# Patient Record
Sex: FEMALE | Race: WHITE | Employment: OTHER | URBAN - METROPOLITAN AREA
[De-identification: names, ages, dates, MRNs, and addresses within clinical notes are randomized per-mention and may not be internally consistent; named-entity substitution may affect disease eponyms.]

---

## 2017-05-19 ENCOUNTER — OFFICE VISIT (OUTPATIENT)
Dept: ORTHOPEDIC SURGERY | Age: 77
End: 2017-05-19

## 2017-05-19 VITALS — HEIGHT: 66 IN | BODY MASS INDEX: 30.58 KG/M2 | WEIGHT: 190.26 LBS

## 2017-05-19 DIAGNOSIS — M19.019 SHOULDER ARTHRITIS: ICD-10-CM

## 2017-05-19 DIAGNOSIS — M25.511 PAIN OF RIGHT SHOULDER REGION: Primary | ICD-10-CM

## 2017-05-19 PROCEDURE — 1123F ACP DISCUSS/DSCN MKR DOCD: CPT | Performed by: ORTHOPAEDIC SURGERY

## 2017-05-19 PROCEDURE — 3017F COLORECTAL CA SCREEN DOC REV: CPT | Performed by: ORTHOPAEDIC SURGERY

## 2017-05-19 PROCEDURE — 3014F SCREEN MAMMO DOC REV: CPT | Performed by: ORTHOPAEDIC SURGERY

## 2017-05-19 PROCEDURE — G8427 DOCREV CUR MEDS BY ELIG CLIN: HCPCS | Performed by: ORTHOPAEDIC SURGERY

## 2017-05-19 PROCEDURE — 1090F PRES/ABSN URINE INCON ASSESS: CPT | Performed by: ORTHOPAEDIC SURGERY

## 2017-05-19 PROCEDURE — 4040F PNEUMOC VAC/ADMIN/RCVD: CPT | Performed by: ORTHOPAEDIC SURGERY

## 2017-05-19 PROCEDURE — 1036F TOBACCO NON-USER: CPT | Performed by: ORTHOPAEDIC SURGERY

## 2017-05-19 PROCEDURE — 73030 X-RAY EXAM OF SHOULDER: CPT | Performed by: ORTHOPAEDIC SURGERY

## 2017-05-19 PROCEDURE — G8400 PT W/DXA NO RESULTS DOC: HCPCS | Performed by: ORTHOPAEDIC SURGERY

## 2017-05-19 PROCEDURE — 99213 OFFICE O/P EST LOW 20 MIN: CPT | Performed by: ORTHOPAEDIC SURGERY

## 2017-05-19 PROCEDURE — G8417 CALC BMI ABV UP PARAM F/U: HCPCS | Performed by: ORTHOPAEDIC SURGERY

## 2017-05-19 RX ORDER — MELOXICAM 7.5 MG/1
7.5 TABLET ORAL
Status: ON HOLD | COMMUNITY
Start: 2017-05-02 | End: 2019-07-11

## 2017-05-30 DIAGNOSIS — M19.019 SHOULDER ARTHRITIS: Primary | ICD-10-CM

## 2017-06-08 DIAGNOSIS — M25.511 RIGHT SHOULDER PAIN, UNSPECIFIED CHRONICITY: Primary | ICD-10-CM

## 2017-06-08 PROCEDURE — L3670 SO ACRO/CLAV CAN WEB PRE OTS: HCPCS | Performed by: ORTHOPAEDIC SURGERY

## 2017-06-20 LAB
ABO GROUPING: NORMAL
ANION GAP SERPL CALCULATED.3IONS-SCNC: 9 MMOL/L (ref 5–13)
ANTIBODY SCREEN: NEGATIVE
APTT: 31.9 SECONDS (ref 23.1–37.6)
BACTERIA: ABNORMAL /HPF
BILIRUBIN URINE: NEGATIVE
BUN / CREAT RATIO: 19
BUN BLDV-MCNC: 18 MG/DL (ref 7–25)
CALCIUM SERPL-MCNC: 10.3 MG/DL (ref 8.4–10.5)
CHLORIDE BLD-SCNC: 105 MMOL/L (ref 98–110)
CLARITY: CLEAR
CO2: 24 MMOL/L (ref 22–29)
COLOR: YELLOW
CREAT SERPL-MCNC: 0.96 MG/DL (ref 0.5–1.2)
EPITHELIAL CELLS, UA: 8 /HPF (ref 0–5)
ERYTHROCYTES URINE: NEGATIVE
GFR AFRICAN AMERICAN: 69 SEE NOTE
GFR NON-AFRICAN AMERICAN: 57 SEE NOTE
GLUCOSE BLD-MCNC: 93 MG/DL (ref 70–99)
GLUCOSE URINE: NEGATIVE MG/DL
HCT VFR BLD CALC: 42.8 % (ref 35–45)
HEMOGLOBIN: 14.5 G/DL (ref 11.7–15.5)
HYALINE CASTS: 1 /LPF (ref 0–2)
INR BLD: 1.1 (ref 0.9–1.1)
KETONES, URINE: NEGATIVE MG/DL
LEUKOCYTE ESTERASE, URINE: ABNORMAL
LEUKOCYTES, UA: 11 /HPF (ref 0–5)
MCH RBC QN AUTO: 27.5 PG (ref 27–33)
MCHC RBC AUTO-ENTMCNC: 33.8 G/DL (ref 32–36)
MCV RBC AUTO: 81.1 FL (ref 80–100)
NITRITE, URINE: NEGATIVE
PDW BLD-RTO: 13.8 % (ref 11–15)
PH UA: 5 (ref 5–8)
PLATELET # BLD: 220 10*3/UL (ref 140–400)
PMV BLD AUTO: 10.3 FL (ref 7.5–11.5)
POTASSIUM SERPL-SCNC: 4.2 MMOL/L (ref 3.5–5.1)
PROTEIN UA: NEGATIVE MG/DL
PROTHROMBIN TIME: 11.8 SECONDS (ref 9.4–12.6)
RBC # BLD: 5.28 10*6/UL (ref 3.8–5.1)
RBC UA: 1 /HPF (ref 0–3)
RH FACTOR: POSITIVE
SODIUM BLD-SCNC: 138 MMOL/L (ref 135–146)
SPECIFIC GRAVITY UA: 1.02 (ref 1–1.03)
STAPH AUREUS SCREEN: NEGATIVE
STAPH AUREUS.METHICILLIN RESISTANT DNA: NEGATIVE
UROBILINOGEN, URINE: <2 MG/DL
WBC # BLD: 7.5 10*3/UL (ref 3.8–10.8)

## 2017-06-21 LAB — BACTERIA IDENTIFIED: NORMAL

## 2017-06-30 DIAGNOSIS — M19.019 SHOULDER ARTHRITIS: Primary | ICD-10-CM

## 2017-07-05 ENCOUNTER — TELEPHONE (OUTPATIENT)
Dept: ORTHOPEDIC SURGERY | Age: 77
End: 2017-07-05

## 2017-07-07 ENCOUNTER — OFFICE VISIT (OUTPATIENT)
Dept: ORTHOPEDIC SURGERY | Age: 77
End: 2017-07-07

## 2017-07-07 VITALS — HEIGHT: 66 IN | BODY MASS INDEX: 30.58 KG/M2 | WEIGHT: 190.26 LBS

## 2017-07-07 DIAGNOSIS — M25.511 PAIN, JOINT, SHOULDER REGION, RIGHT: Primary | ICD-10-CM

## 2017-07-07 DIAGNOSIS — Z96.611 STATUS POST TOTAL REPLACEMENT OF RIGHT SHOULDER: ICD-10-CM

## 2017-07-07 PROCEDURE — 99024 POSTOP FOLLOW-UP VISIT: CPT | Performed by: PHYSICIAN ASSISTANT

## 2017-07-07 PROCEDURE — 73030 X-RAY EXAM OF SHOULDER: CPT | Performed by: PHYSICIAN ASSISTANT

## 2017-08-18 ENCOUNTER — OFFICE VISIT (OUTPATIENT)
Dept: ORTHOPEDIC SURGERY | Age: 77
End: 2017-08-18

## 2017-08-18 VITALS — WEIGHT: 190.26 LBS | BODY MASS INDEX: 30.58 KG/M2 | HEIGHT: 66 IN

## 2017-08-18 DIAGNOSIS — M25.511 RIGHT SHOULDER PAIN, UNSPECIFIED CHRONICITY: Primary | ICD-10-CM

## 2017-08-18 PROCEDURE — 99024 POSTOP FOLLOW-UP VISIT: CPT | Performed by: ORTHOPAEDIC SURGERY

## 2017-08-18 PROCEDURE — 73030 X-RAY EXAM OF SHOULDER: CPT | Performed by: ORTHOPAEDIC SURGERY

## 2017-10-10 ENCOUNTER — OFFICE VISIT (OUTPATIENT)
Dept: ORTHOPEDIC SURGERY | Age: 77
End: 2017-10-10

## 2017-10-10 VITALS — BODY MASS INDEX: 30.22 KG/M2 | HEIGHT: 66 IN | WEIGHT: 188 LBS

## 2017-10-10 DIAGNOSIS — M25.511 RIGHT SHOULDER PAIN, UNSPECIFIED CHRONICITY: Primary | ICD-10-CM

## 2017-10-10 PROCEDURE — 1036F TOBACCO NON-USER: CPT | Performed by: ORTHOPAEDIC SURGERY

## 2017-10-10 PROCEDURE — 99213 OFFICE O/P EST LOW 20 MIN: CPT | Performed by: ORTHOPAEDIC SURGERY

## 2017-10-10 PROCEDURE — 1090F PRES/ABSN URINE INCON ASSESS: CPT | Performed by: ORTHOPAEDIC SURGERY

## 2017-10-10 PROCEDURE — G8400 PT W/DXA NO RESULTS DOC: HCPCS | Performed by: ORTHOPAEDIC SURGERY

## 2017-10-10 PROCEDURE — 3014F SCREEN MAMMO DOC REV: CPT | Performed by: ORTHOPAEDIC SURGERY

## 2017-10-10 PROCEDURE — 1123F ACP DISCUSS/DSCN MKR DOCD: CPT | Performed by: ORTHOPAEDIC SURGERY

## 2017-10-10 PROCEDURE — G8484 FLU IMMUNIZE NO ADMIN: HCPCS | Performed by: ORTHOPAEDIC SURGERY

## 2017-10-10 PROCEDURE — 3017F COLORECTAL CA SCREEN DOC REV: CPT | Performed by: ORTHOPAEDIC SURGERY

## 2017-10-10 PROCEDURE — G8427 DOCREV CUR MEDS BY ELIG CLIN: HCPCS | Performed by: ORTHOPAEDIC SURGERY

## 2017-10-10 PROCEDURE — 73030 X-RAY EXAM OF SHOULDER: CPT | Performed by: ORTHOPAEDIC SURGERY

## 2017-10-10 PROCEDURE — 4040F PNEUMOC VAC/ADMIN/RCVD: CPT | Performed by: ORTHOPAEDIC SURGERY

## 2017-10-10 PROCEDURE — G8417 CALC BMI ABV UP PARAM F/U: HCPCS | Performed by: ORTHOPAEDIC SURGERY

## 2018-06-05 ENCOUNTER — OFFICE VISIT (OUTPATIENT)
Dept: ORTHOPEDIC SURGERY | Age: 78
End: 2018-06-05

## 2018-06-05 DIAGNOSIS — M19.019 SHOULDER ARTHRITIS: ICD-10-CM

## 2018-06-05 DIAGNOSIS — Z96.611 STATUS POST TOTAL REPLACEMENT OF RIGHT SHOULDER: ICD-10-CM

## 2018-06-05 DIAGNOSIS — M25.512 LEFT SHOULDER PAIN, UNSPECIFIED CHRONICITY: Primary | ICD-10-CM

## 2018-06-05 PROCEDURE — 4040F PNEUMOC VAC/ADMIN/RCVD: CPT | Performed by: PHYSICIAN ASSISTANT

## 2018-06-05 PROCEDURE — 3017F COLORECTAL CA SCREEN DOC REV: CPT | Performed by: PHYSICIAN ASSISTANT

## 2018-06-05 PROCEDURE — 1036F TOBACCO NON-USER: CPT | Performed by: PHYSICIAN ASSISTANT

## 2018-06-05 PROCEDURE — 1090F PRES/ABSN URINE INCON ASSESS: CPT | Performed by: PHYSICIAN ASSISTANT

## 2018-06-05 PROCEDURE — G8427 DOCREV CUR MEDS BY ELIG CLIN: HCPCS | Performed by: PHYSICIAN ASSISTANT

## 2018-06-05 PROCEDURE — G8400 PT W/DXA NO RESULTS DOC: HCPCS | Performed by: PHYSICIAN ASSISTANT

## 2018-06-05 PROCEDURE — G8417 CALC BMI ABV UP PARAM F/U: HCPCS | Performed by: PHYSICIAN ASSISTANT

## 2018-06-05 PROCEDURE — 1123F ACP DISCUSS/DSCN MKR DOCD: CPT | Performed by: PHYSICIAN ASSISTANT

## 2018-06-05 PROCEDURE — 99213 OFFICE O/P EST LOW 20 MIN: CPT | Performed by: PHYSICIAN ASSISTANT

## 2019-07-10 ENCOUNTER — APPOINTMENT (OUTPATIENT)
Dept: CT IMAGING | Age: 79
DRG: 871 | End: 2019-07-10
Payer: MEDICARE

## 2019-07-10 ENCOUNTER — HOSPITAL ENCOUNTER (INPATIENT)
Age: 79
LOS: 5 days | Discharge: NURSING, REHAB OR LONG-TERM CARE | DRG: 871 | End: 2019-07-16
Attending: EMERGENCY MEDICINE | Admitting: INTERNAL MEDICINE
Payer: MEDICARE

## 2019-07-10 ENCOUNTER — APPOINTMENT (OUTPATIENT)
Dept: GENERAL RADIOLOGY | Age: 79
DRG: 871 | End: 2019-07-10
Payer: MEDICARE

## 2019-07-10 DIAGNOSIS — N39.0 URINARY TRACT INFECTION WITHOUT HEMATURIA, SITE UNSPECIFIED: Primary | ICD-10-CM

## 2019-07-10 LAB
A/G RATIO: 1.4 (ref 1.1–2.2)
ALBUMIN SERPL-MCNC: 4 G/DL (ref 3.4–5)
ALP BLD-CCNC: 109 U/L (ref 40–129)
ALT SERPL-CCNC: <5 U/L (ref 10–40)
AMORPHOUS: ABNORMAL /HPF
ANION GAP SERPL CALCULATED.3IONS-SCNC: 17 MMOL/L (ref 3–16)
AST SERPL-CCNC: 10 U/L (ref 15–37)
BACTERIA: ABNORMAL /HPF
BASE EXCESS VENOUS: 1 (ref -3–3)
BASOPHILS ABSOLUTE: 0 K/UL (ref 0–0.2)
BASOPHILS RELATIVE PERCENT: 0.3 %
BILIRUB SERPL-MCNC: 0.7 MG/DL (ref 0–1)
BILIRUBIN URINE: NEGATIVE
BLOOD, URINE: NEGATIVE
BUN BLDV-MCNC: 15 MG/DL (ref 7–20)
CALCIUM SERPL-MCNC: 10.4 MG/DL (ref 8.3–10.6)
CHLORIDE BLD-SCNC: 102 MMOL/L (ref 99–110)
CLARITY: ABNORMAL
CO2: 21 MMOL/L (ref 21–32)
COLOR: YELLOW
CREAT SERPL-MCNC: 1.1 MG/DL (ref 0.6–1.2)
EOSINOPHILS ABSOLUTE: 0 K/UL (ref 0–0.6)
EOSINOPHILS RELATIVE PERCENT: 0.1 %
EPITHELIAL CELLS, UA: ABNORMAL /HPF
GFR AFRICAN AMERICAN: 58
GFR NON-AFRICAN AMERICAN: 48
GLOBULIN: 2.9 G/DL
GLUCOSE BLD-MCNC: 126 MG/DL (ref 70–99)
GLUCOSE URINE: NEGATIVE MG/DL
HCO3 VENOUS: 22.8 MMOL/L (ref 23–29)
HCT VFR BLD CALC: 39 % (ref 36–48)
HEMOGLOBIN: 13.3 G/DL (ref 12–16)
KETONES, URINE: ABNORMAL MG/DL
LACTATE: 1.56 MMOL/L (ref 0.4–2)
LEUKOCYTE ESTERASE, URINE: ABNORMAL
LYMPHOCYTES ABSOLUTE: 1.6 K/UL (ref 1–5.1)
LYMPHOCYTES RELATIVE PERCENT: 12.1 %
MAGNESIUM: 1.8 MG/DL (ref 1.8–2.4)
MCH RBC QN AUTO: 29.7 PG (ref 26–34)
MCHC RBC AUTO-ENTMCNC: 34 G/DL (ref 31–36)
MCV RBC AUTO: 87.3 FL (ref 80–100)
MICROSCOPIC EXAMINATION: YES
MONOCYTES ABSOLUTE: 0.9 K/UL (ref 0–1.3)
MONOCYTES RELATIVE PERCENT: 7.1 %
NEUTROPHILS ABSOLUTE: 10.6 K/UL (ref 1.7–7.7)
NEUTROPHILS RELATIVE PERCENT: 80.4 %
NITRITE, URINE: NEGATIVE
O2 SAT, VEN: 94 %
PCO2, VEN: 24.7 MM HG (ref 40–50)
PDW BLD-RTO: 14.7 % (ref 12.4–15.4)
PERFORMED ON: ABNORMAL
PH UA: 8.5 (ref 5–8)
PH VENOUS: 7.57 (ref 7.35–7.45)
PLATELET # BLD: 263 K/UL (ref 135–450)
PMV BLD AUTO: 9.6 FL (ref 5–10.5)
PO2, VEN: 58 MM HG
POC SAMPLE TYPE: ABNORMAL
POTASSIUM REFLEX MAGNESIUM: 3.3 MMOL/L (ref 3.5–5.1)
PROTEIN UA: 30 MG/DL
RBC # BLD: 4.47 M/UL (ref 4–5.2)
RBC UA: ABNORMAL /HPF (ref 0–2)
SODIUM BLD-SCNC: 140 MMOL/L (ref 136–145)
SPECIFIC GRAVITY UA: 1.01 (ref 1–1.03)
TCO2 CALC VENOUS: 24 MMOL/L
TOTAL PROTEIN: 6.9 G/DL (ref 6.4–8.2)
TROPONIN: <0.01 NG/ML
URINE REFLEX TO CULTURE: YES
URINE TYPE: ABNORMAL
UROBILINOGEN, URINE: 0.2 E.U./DL
WBC # BLD: 13.2 K/UL (ref 4–11)
WBC UA: ABNORMAL /HPF (ref 0–5)

## 2019-07-10 PROCEDURE — 84443 ASSAY THYROID STIM HORMONE: CPT

## 2019-07-10 PROCEDURE — 93005 ELECTROCARDIOGRAM TRACING: CPT | Performed by: EMERGENCY MEDICINE

## 2019-07-10 PROCEDURE — 87077 CULTURE AEROBIC IDENTIFY: CPT

## 2019-07-10 PROCEDURE — 85025 COMPLETE CBC W/AUTO DIFF WBC: CPT

## 2019-07-10 PROCEDURE — 87186 SC STD MICRODIL/AGAR DIL: CPT

## 2019-07-10 PROCEDURE — 87086 URINE CULTURE/COLONY COUNT: CPT

## 2019-07-10 PROCEDURE — 36415 COLL VENOUS BLD VENIPUNCTURE: CPT

## 2019-07-10 PROCEDURE — 6370000000 HC RX 637 (ALT 250 FOR IP): Performed by: EMERGENCY MEDICINE

## 2019-07-10 PROCEDURE — 81001 URINALYSIS AUTO W/SCOPE: CPT

## 2019-07-10 PROCEDURE — 87040 BLOOD CULTURE FOR BACTERIA: CPT

## 2019-07-10 PROCEDURE — 96361 HYDRATE IV INFUSION ADD-ON: CPT

## 2019-07-10 PROCEDURE — 99285 EMERGENCY DEPT VISIT HI MDM: CPT

## 2019-07-10 PROCEDURE — 82803 BLOOD GASES ANY COMBINATION: CPT

## 2019-07-10 PROCEDURE — 84436 ASSAY OF TOTAL THYROXINE: CPT

## 2019-07-10 PROCEDURE — 71046 X-RAY EXAM CHEST 2 VIEWS: CPT

## 2019-07-10 PROCEDURE — 70450 CT HEAD/BRAIN W/O DYE: CPT

## 2019-07-10 PROCEDURE — 84484 ASSAY OF TROPONIN QUANT: CPT

## 2019-07-10 PROCEDURE — 80053 COMPREHEN METABOLIC PANEL: CPT

## 2019-07-10 PROCEDURE — 83605 ASSAY OF LACTIC ACID: CPT

## 2019-07-10 PROCEDURE — 83735 ASSAY OF MAGNESIUM: CPT

## 2019-07-10 PROCEDURE — 2580000003 HC RX 258: Performed by: EMERGENCY MEDICINE

## 2019-07-10 RX ORDER — NORTRIPTYLINE HYDROCHLORIDE 25 MG/1
75 CAPSULE ORAL ONCE
Status: COMPLETED | OUTPATIENT
Start: 2019-07-10 | End: 2019-07-11

## 2019-07-10 RX ORDER — QUETIAPINE FUMARATE 300 MG/1
300 TABLET, FILM COATED ORAL ONCE
Status: COMPLETED | OUTPATIENT
Start: 2019-07-10 | End: 2019-07-11

## 2019-07-10 RX ORDER — SODIUM CHLORIDE, SODIUM LACTATE, POTASSIUM CHLORIDE, CALCIUM CHLORIDE 600; 310; 30; 20 MG/100ML; MG/100ML; MG/100ML; MG/100ML
1000 INJECTION, SOLUTION INTRAVENOUS ONCE
Status: COMPLETED | OUTPATIENT
Start: 2019-07-10 | End: 2019-07-10

## 2019-07-10 RX ORDER — ESCITALOPRAM OXALATE 20 MG/1
20 TABLET ORAL ONCE
Status: COMPLETED | OUTPATIENT
Start: 2019-07-10 | End: 2019-07-11

## 2019-07-10 RX ADMIN — CARBIDOPA AND LEVODOPA 3 TABLET: 25; 100 TABLET ORAL at 22:45

## 2019-07-10 RX ADMIN — SODIUM CHLORIDE, POTASSIUM CHLORIDE, SODIUM LACTATE AND CALCIUM CHLORIDE 1000 ML: 600; 310; 30; 20 INJECTION, SOLUTION INTRAVENOUS at 22:07

## 2019-07-11 ENCOUNTER — APPOINTMENT (OUTPATIENT)
Dept: GENERAL RADIOLOGY | Age: 79
DRG: 871 | End: 2019-07-11
Payer: MEDICARE

## 2019-07-11 PROBLEM — N39.0 URINARY TRACT INFECTION: Status: ACTIVE | Noted: 2019-07-11

## 2019-07-11 LAB
ANION GAP SERPL CALCULATED.3IONS-SCNC: 12 MMOL/L (ref 3–16)
BASOPHILS ABSOLUTE: 0 K/UL (ref 0–0.2)
BASOPHILS RELATIVE PERCENT: 0.3 %
BUN BLDV-MCNC: 16 MG/DL (ref 7–20)
CALCIUM SERPL-MCNC: 9.8 MG/DL (ref 8.3–10.6)
CHLORIDE BLD-SCNC: 104 MMOL/L (ref 99–110)
CO2: 24 MMOL/L (ref 21–32)
CREAT SERPL-MCNC: 0.9 MG/DL (ref 0.6–1.2)
EKG ATRIAL RATE: 83 BPM
EKG DIAGNOSIS: NORMAL
EKG P AXIS: 49 DEGREES
EKG P-R INTERVAL: 196 MS
EKG Q-T INTERVAL: 442 MS
EKG QRS DURATION: 98 MS
EKG QTC CALCULATION (BAZETT): 519 MS
EKG R AXIS: 33 DEGREES
EKG T AXIS: 41 DEGREES
EKG VENTRICULAR RATE: 83 BPM
EOSINOPHILS ABSOLUTE: 0 K/UL (ref 0–0.6)
EOSINOPHILS RELATIVE PERCENT: 0.3 %
GFR AFRICAN AMERICAN: >60
GFR NON-AFRICAN AMERICAN: >60
GLUCOSE BLD-MCNC: 111 MG/DL (ref 70–99)
GLUCOSE BLD-MCNC: 113 MG/DL (ref 70–99)
HCT VFR BLD CALC: 34.6 % (ref 36–48)
HEMOGLOBIN: 11.8 G/DL (ref 12–16)
LACTIC ACID: 1 MMOL/L (ref 0.4–2)
LV EF: 58 %
LVEF MODALITY: NORMAL
LYMPHOCYTES ABSOLUTE: 1.4 K/UL (ref 1–5.1)
LYMPHOCYTES RELATIVE PERCENT: 15.7 %
MAGNESIUM: 1.8 MG/DL (ref 1.8–2.4)
MCH RBC QN AUTO: 29.4 PG (ref 26–34)
MCHC RBC AUTO-ENTMCNC: 34 G/DL (ref 31–36)
MCV RBC AUTO: 86.3 FL (ref 80–100)
MONOCYTES ABSOLUTE: 0.7 K/UL (ref 0–1.3)
MONOCYTES RELATIVE PERCENT: 7.6 %
NEUTROPHILS ABSOLUTE: 7 K/UL (ref 1.7–7.7)
NEUTROPHILS RELATIVE PERCENT: 76.1 %
PDW BLD-RTO: 14.3 % (ref 12.4–15.4)
PERFORMED ON: ABNORMAL
PLATELET # BLD: 211 K/UL (ref 135–450)
PMV BLD AUTO: 9 FL (ref 5–10.5)
POTASSIUM REFLEX MAGNESIUM: 3.5 MMOL/L (ref 3.5–5.1)
PROCALCITONIN: 0.08 NG/ML (ref 0–0.15)
RBC # BLD: 4.01 M/UL (ref 4–5.2)
SODIUM BLD-SCNC: 140 MMOL/L (ref 136–145)
T4 TOTAL: 6.5 UG/DL (ref 4.5–10.9)
TSH SERPL DL<=0.05 MIU/L-ACNC: 1.84 UIU/ML (ref 0.27–4.2)
WBC # BLD: 9.2 K/UL (ref 4–11)

## 2019-07-11 PROCEDURE — 80048 BASIC METABOLIC PNL TOTAL CA: CPT

## 2019-07-11 PROCEDURE — 2580000003 HC RX 258: Performed by: STUDENT IN AN ORGANIZED HEALTH CARE EDUCATION/TRAINING PROGRAM

## 2019-07-11 PROCEDURE — 6370000000 HC RX 637 (ALT 250 FOR IP): Performed by: EMERGENCY MEDICINE

## 2019-07-11 PROCEDURE — 6360000002 HC RX W HCPCS: Performed by: STUDENT IN AN ORGANIZED HEALTH CARE EDUCATION/TRAINING PROGRAM

## 2019-07-11 PROCEDURE — C8929 TTE W OR WO FOL WCON,DOPPLER: HCPCS

## 2019-07-11 PROCEDURE — 1200000000 HC SEMI PRIVATE

## 2019-07-11 PROCEDURE — 83735 ASSAY OF MAGNESIUM: CPT

## 2019-07-11 PROCEDURE — 6370000000 HC RX 637 (ALT 250 FOR IP): Performed by: STUDENT IN AN ORGANIZED HEALTH CARE EDUCATION/TRAINING PROGRAM

## 2019-07-11 PROCEDURE — 36415 COLL VENOUS BLD VENIPUNCTURE: CPT

## 2019-07-11 PROCEDURE — 6360000002 HC RX W HCPCS: Performed by: EMERGENCY MEDICINE

## 2019-07-11 PROCEDURE — 87040 BLOOD CULTURE FOR BACTERIA: CPT

## 2019-07-11 PROCEDURE — 83605 ASSAY OF LACTIC ACID: CPT

## 2019-07-11 PROCEDURE — 73630 X-RAY EXAM OF FOOT: CPT

## 2019-07-11 PROCEDURE — 96374 THER/PROPH/DIAG INJ IV PUSH: CPT

## 2019-07-11 PROCEDURE — 84145 PROCALCITONIN (PCT): CPT

## 2019-07-11 PROCEDURE — 2580000003 HC RX 258: Performed by: EMERGENCY MEDICINE

## 2019-07-11 PROCEDURE — 85025 COMPLETE CBC W/AUTO DIFF WBC: CPT

## 2019-07-11 RX ORDER — ESCITALOPRAM OXALATE 10 MG/1
10 TABLET ORAL NIGHTLY
Status: DISCONTINUED | OUTPATIENT
Start: 2019-07-11 | End: 2019-07-16 | Stop reason: HOSPADM

## 2019-07-11 RX ORDER — QUETIAPINE FUMARATE 25 MG/1
50 TABLET, FILM COATED ORAL NIGHTLY
Status: DISCONTINUED | OUTPATIENT
Start: 2019-07-11 | End: 2019-07-12

## 2019-07-11 RX ORDER — SODIUM CHLORIDE, SODIUM LACTATE, POTASSIUM CHLORIDE, CALCIUM CHLORIDE 600; 310; 30; 20 MG/100ML; MG/100ML; MG/100ML; MG/100ML
INJECTION, SOLUTION INTRAVENOUS CONTINUOUS
Status: DISCONTINUED | OUTPATIENT
Start: 2019-07-11 | End: 2019-07-12

## 2019-07-11 RX ORDER — RISPERIDONE 1 MG/1
1 TABLET, FILM COATED ORAL ONCE
Status: COMPLETED | OUTPATIENT
Start: 2019-07-11 | End: 2019-07-11

## 2019-07-11 RX ORDER — PANTOPRAZOLE SODIUM 40 MG/1
40 TABLET, DELAYED RELEASE ORAL
Status: DISCONTINUED | OUTPATIENT
Start: 2019-07-11 | End: 2019-07-16 | Stop reason: HOSPADM

## 2019-07-11 RX ORDER — POTASSIUM CHLORIDE 20 MEQ/1
40 TABLET, EXTENDED RELEASE ORAL ONCE
Status: COMPLETED | OUTPATIENT
Start: 2019-07-11 | End: 2019-07-11

## 2019-07-11 RX ORDER — LORAZEPAM 2 MG/ML
1.5 INJECTION INTRAMUSCULAR ONCE
Status: COMPLETED | OUTPATIENT
Start: 2019-07-11 | End: 2019-07-11

## 2019-07-11 RX ORDER — LEVOMEFOLATE CALCIUM 15 MG
15 TABLET ORAL DAILY
COMMUNITY

## 2019-07-11 RX ORDER — MIDODRINE HYDROCHLORIDE 5 MG/1
2.5 TABLET ORAL DAILY
Status: DISCONTINUED | OUTPATIENT
Start: 2019-07-11 | End: 2019-07-12

## 2019-07-11 RX ORDER — MIDODRINE HYDROCHLORIDE 5 MG/1
2.5 TABLET ORAL 2 TIMES DAILY
COMMUNITY

## 2019-07-11 RX ORDER — POTASSIUM CHLORIDE 7.45 MG/ML
10 INJECTION INTRAVENOUS ONCE
Status: COMPLETED | OUTPATIENT
Start: 2019-07-11 | End: 2019-07-11

## 2019-07-11 RX ORDER — ONDANSETRON 2 MG/ML
4 INJECTION INTRAMUSCULAR; INTRAVENOUS EVERY 6 HOURS PRN
Status: DISCONTINUED | OUTPATIENT
Start: 2019-07-11 | End: 2019-07-16 | Stop reason: HOSPADM

## 2019-07-11 RX ORDER — QUETIAPINE FUMARATE 200 MG/1
200 TABLET, FILM COATED ORAL NIGHTLY
COMMUNITY

## 2019-07-11 RX ORDER — FOLIC ACID 1 MG/1
1 TABLET ORAL DAILY
Status: DISCONTINUED | OUTPATIENT
Start: 2019-07-11 | End: 2019-07-16 | Stop reason: HOSPADM

## 2019-07-11 RX ORDER — LANOLIN ALCOHOL/MO/W.PET/CERES
3 CREAM (GRAM) TOPICAL NIGHTLY
COMMUNITY

## 2019-07-11 RX ORDER — SODIUM CHLORIDE 1000 MG
1 TABLET, SOLUBLE MISCELLANEOUS
Status: DISCONTINUED | OUTPATIENT
Start: 2019-07-11 | End: 2019-07-16 | Stop reason: HOSPADM

## 2019-07-11 RX ORDER — SODIUM CHLORIDE 1000 MG
1 TABLET, SOLUBLE MISCELLANEOUS 3 TIMES DAILY
COMMUNITY

## 2019-07-11 RX ORDER — SODIUM CHLORIDE 0.9 % (FLUSH) 0.9 %
10 SYRINGE (ML) INJECTION EVERY 12 HOURS SCHEDULED
Status: DISCONTINUED | OUTPATIENT
Start: 2019-07-11 | End: 2019-07-16 | Stop reason: HOSPADM

## 2019-07-11 RX ORDER — UREA 10 %
3 LOTION (ML) TOPICAL NIGHTLY PRN
Status: DISCONTINUED | OUTPATIENT
Start: 2019-07-12 | End: 2019-07-16 | Stop reason: HOSPADM

## 2019-07-11 RX ORDER — NORTRIPTYLINE HYDROCHLORIDE 25 MG/1
75 CAPSULE ORAL NIGHTLY
Status: DISCONTINUED | OUTPATIENT
Start: 2019-07-11 | End: 2019-07-12

## 2019-07-11 RX ORDER — PROPRANOLOL HCL 60 MG
120 CAPSULE, EXTENDED RELEASE 24HR ORAL DAILY
Status: DISCONTINUED | OUTPATIENT
Start: 2019-07-11 | End: 2019-07-11

## 2019-07-11 RX ORDER — NORTRIPTYLINE HYDROCHLORIDE 25 MG/1
75 CAPSULE ORAL NIGHTLY
COMMUNITY

## 2019-07-11 RX ORDER — MAGNESIUM SULFATE 1 G/100ML
1 INJECTION INTRAVENOUS ONCE
Status: COMPLETED | OUTPATIENT
Start: 2019-07-11 | End: 2019-07-11

## 2019-07-11 RX ORDER — IRBESARTAN AND HYDROCHLOROTHIAZIDE 300; 12.5 MG/1; MG/1
1 TABLET, FILM COATED ORAL DAILY
Status: DISCONTINUED | OUTPATIENT
Start: 2019-07-11 | End: 2019-07-11

## 2019-07-11 RX ORDER — SODIUM CHLORIDE 0.9 % (FLUSH) 0.9 %
10 SYRINGE (ML) INJECTION PRN
Status: DISCONTINUED | OUTPATIENT
Start: 2019-07-11 | End: 2019-07-16 | Stop reason: HOSPADM

## 2019-07-11 RX ORDER — QUETIAPINE FUMARATE 25 MG/1
25 TABLET, FILM COATED ORAL EVERY MORNING
COMMUNITY

## 2019-07-11 RX ADMIN — CARBIDOPA AND LEVODOPA 3 TABLET: 25; 100 TABLET ORAL at 08:55

## 2019-07-11 RX ADMIN — MIDODRINE HYDROCHLORIDE 2.5 MG: 5 TABLET ORAL at 08:56

## 2019-07-11 RX ADMIN — CARBIDOPA AND LEVODOPA 3 TABLET: 25; 100 TABLET ORAL at 17:25

## 2019-07-11 RX ADMIN — PANTOPRAZOLE SODIUM 40 MG: 40 TABLET, DELAYED RELEASE ORAL at 07:08

## 2019-07-11 RX ADMIN — ESCITALOPRAM OXALATE 20 MG: 20 TABLET ORAL at 00:03

## 2019-07-11 RX ADMIN — ESCITALOPRAM OXALATE 10 MG: 10 TABLET, FILM COATED ORAL at 20:31

## 2019-07-11 RX ADMIN — Medication 1 G: at 23:28

## 2019-07-11 RX ADMIN — LORAZEPAM 1.5 MG: 2 INJECTION INTRAMUSCULAR; INTRAVENOUS at 23:27

## 2019-07-11 RX ADMIN — POTASSIUM CHLORIDE 40 MEQ: 20 TABLET, EXTENDED RELEASE ORAL at 09:44

## 2019-07-11 RX ADMIN — SODIUM CHLORIDE, POTASSIUM CHLORIDE, SODIUM LACTATE AND CALCIUM CHLORIDE: 600; 310; 30; 20 INJECTION, SOLUTION INTRAVENOUS at 21:56

## 2019-07-11 RX ADMIN — SODIUM CHLORIDE, POTASSIUM CHLORIDE, SODIUM LACTATE AND CALCIUM CHLORIDE: 600; 310; 30; 20 INJECTION, SOLUTION INTRAVENOUS at 01:29

## 2019-07-11 RX ADMIN — ENOXAPARIN SODIUM 40 MG: 40 INJECTION SUBCUTANEOUS at 08:56

## 2019-07-11 RX ADMIN — NORTRIPTYLINE HYDROCHLORIDE 75 MG: 25 CAPSULE ORAL at 20:31

## 2019-07-11 RX ADMIN — FOLIC ACID 1 MG: 1 TABLET ORAL at 08:56

## 2019-07-11 RX ADMIN — QUETIAPINE FUMARATE 300 MG: 300 TABLET ORAL at 00:11

## 2019-07-11 RX ADMIN — CEFTRIAXONE 1 G: 1 INJECTION, POWDER, FOR SOLUTION INTRAMUSCULAR; INTRAVENOUS at 00:08

## 2019-07-11 RX ADMIN — POTASSIUM CHLORIDE 10 MEQ: 7.46 INJECTION, SOLUTION INTRAVENOUS at 03:55

## 2019-07-11 RX ADMIN — CARBIDOPA AND LEVODOPA 3 TABLET: 25; 100 TABLET ORAL at 13:15

## 2019-07-11 RX ADMIN — RISPERIDONE 1 MG: 1 TABLET ORAL at 23:27

## 2019-07-11 RX ADMIN — CEFTRIAXONE 1 G: 1 INJECTION, POWDER, FOR SOLUTION INTRAMUSCULAR; INTRAVENOUS at 23:50

## 2019-07-11 RX ADMIN — NORTRIPTYLINE HYDROCHLORIDE 75 MG: 25 CAPSULE ORAL at 00:01

## 2019-07-11 RX ADMIN — QUETIAPINE FUMARATE 50 MG: 25 TABLET ORAL at 20:31

## 2019-07-11 RX ADMIN — CARBIDOPA AND LEVODOPA 3 TABLET: 25; 100 TABLET ORAL at 20:31

## 2019-07-11 RX ADMIN — MAGNESIUM SULFATE HEPTAHYDRATE 1 G: 1 INJECTION, SOLUTION INTRAVENOUS at 10:58

## 2019-07-11 ASSESSMENT — PAIN DESCRIPTION - DESCRIPTORS
DESCRIPTORS: ACHING
DESCRIPTORS: ACHING

## 2019-07-11 ASSESSMENT — PAIN SCALES - GENERAL
PAINLEVEL_OUTOF10: 0
PAINLEVEL_OUTOF10: 0
PAINLEVEL_OUTOF10: 5
PAINLEVEL_OUTOF10: 5
PAINLEVEL_OUTOF10: 0
PAINLEVEL_OUTOF10: 5
PAINLEVEL_OUTOF10: 0
PAINLEVEL_OUTOF10: 0

## 2019-07-11 ASSESSMENT — PAIN DESCRIPTION - PROGRESSION
CLINICAL_PROGRESSION: NOT CHANGED
CLINICAL_PROGRESSION: NOT CHANGED

## 2019-07-11 ASSESSMENT — PAIN DESCRIPTION - ONSET
ONSET: ON-GOING
ONSET: ON-GOING

## 2019-07-11 ASSESSMENT — PAIN DESCRIPTION - FREQUENCY
FREQUENCY: INTERMITTENT
FREQUENCY: INTERMITTENT

## 2019-07-11 ASSESSMENT — PAIN DESCRIPTION - PAIN TYPE
TYPE: ACUTE PAIN
TYPE: ACUTE PAIN

## 2019-07-11 ASSESSMENT — ENCOUNTER SYMPTOMS
SHORTNESS OF BREATH: 0
CHEST TIGHTNESS: 0

## 2019-07-11 ASSESSMENT — PAIN DESCRIPTION - LOCATION
LOCATION: FOOT
LOCATION: FOOT

## 2019-07-11 ASSESSMENT — PAIN DESCRIPTION - ORIENTATION
ORIENTATION: LEFT
ORIENTATION: LEFT

## 2019-07-11 ASSESSMENT — PAIN - FUNCTIONAL ASSESSMENT
PAIN_FUNCTIONAL_ASSESSMENT: ACTIVITIES ARE NOT PREVENTED
PAIN_FUNCTIONAL_ASSESSMENT: ACTIVITIES ARE NOT PREVENTED

## 2019-07-11 NOTE — ED NOTES
Son is also concerned about patient's nighttime serequel.  Will inform MD. Angela Mcconnell RN  07/10/19 5822

## 2019-07-11 NOTE — H&P
Provider, MD       Allergies: Oxycodone-acetaminophen    Social History:      TOBACCO:   reports that she has never smoked. She has never used smokeless tobacco.  ETOH:  reports that she drank about 0.6 oz of alcohol per week. Family History:     History reviewed. No pertinent family history. PHYSICAL EXAM PERFORMED:    /61   Pulse 76   Temp 98.1 °F (36.7 °C) (Oral)   Resp 18   Ht 5' 6.14\" (1.68 m)   Wt 154 lb 5.2 oz (70 kg)   SpO2 96%   BMI 24.80 kg/m²     Physical Exam   Constitutional: She is oriented to person, place, and time. She appears well-developed and well-nourished. No distress. HENT:   Head: Normocephalic and atraumatic. Eyes: Pupils are equal, round, and reactive to light. EOM are normal.   Neck: Normal range of motion. Neck supple. Cardiovascular: Normal rate and regular rhythm. Murmur heard. Systolic   Pulmonary/Chest: Breath sounds normal. No stridor. No respiratory distress. She has no wheezes. She has no rales. Decreased air intake. Abdominal: Soft. Bowel sounds are normal. She exhibits no distension. There is no tenderness. There is no guarding. Musculoskeletal: Normal range of motion. Neurological: She is alert and oriented to person, place, and time. No cranial nerve deficit or sensory deficit. Coordination normal.   Skin: Skin is dry. There is pallor. Psychiatric: She has a normal mood and affect. Vitals reviewed. Labs:     Recent Labs     07/10/19  2217   WBC 13.2*   HGB 13.3   HCT 39.0        Recent Labs     07/10/19  2217      K 3.3*      CO2 21   BUN 15   CREATININE 1.1   CALCIUM 10.4     Recent Labs     07/10/19  2217   AST 10*   ALT <5*   BILITOT 0.7   ALKPHOS 109     No results for input(s): INR in the last 72 hours.   Recent Labs     07/10/19  2217   TROPONINI <0.01       Urinalysis:      Lab Results   Component Value Date    NITRU Negative 07/10/2019    WBCUA 6-10 07/10/2019    BACTERIA 4+ 07/10/2019    RBCUA 0-2

## 2019-07-11 NOTE — PROGRESS NOTES
Patient received to room 321 9675 from ED. Patient A&Ox4 upon arrival. Tele monitor applied, rate and rhythm verified with monitor reader. VSS. Patient oriented to room, staff, and call system. Educated on fall protocol and hourly rounding. Assessment as documented. Admission navigator complete. IVF infusing. Bed locked in low position. Video monitor in use. Patient informed to utilize call light with any needs. Pt verbalized understanding. Call light within reach. Will continue to monitor.

## 2019-07-11 NOTE — PROGRESS NOTES
Hepatic:   Recent Labs     07/10/19  2217   AST 10*   ALT <5*   BILITOT 0.7   PROT 6.9   LABALBU 4.0   ALKPHOS 109     Troponin:   Recent Labs     07/10/19  2217   [address] <0.01     BNP: No results for input(s): BNP in the last 72 hours. Lipids: No results for input(s): CHOL, HDL in the last 72 hours. Invalid input(s): LDLCALCU, TRIGLYCERIDE  ABGs:  No results for input(s): PHART, OJO9AUN, PO2ART, GMN9EQD, BEART, THGBART, J5QLHWQV, FZU0JKB in the last 72 hours. INR: No results for input(s): INR in the last 72 hours. Lactate:   Recent Labs     07/10/19  2214   LACTATE 1.56     Cultures:  -----------------------------------------------------------------  RAD:   CT Head WO Contrast   Final Result      1. No acute intracranial process. 2. Mild cerebral atrophy. 3. Mild chronic small vessel ischemic disease. XR CHEST STANDARD (2 VW)   Final Result      No acute pulmonary disease. Mild cardiomegaly. XR FOOT LEFT (MIN 3 VIEWS)    (Results Pending)       Assessment/Plan:   76yo F with PMHx of Parkinson's Disease, HTN, Degenerative Disk Disease, Hep B that was admitted for AMS 2/2 UTI and recent fall.     Sepsis - 2/2 UTI   Patient confused, + urgency & frequency, no dysuria. Not febrile. Leukocytosis improved from 13.2 on admission. UA showed 6-10 wbc/hpf, 4+bacteira, small leukocyte esterase  -Urine culture pending  -Continue Rocephin  -IV fluids  - Pro-calcitonin pending     Orthostatic Hypotension 2/2 autonomic dysfunction associated with Parkinson's Disease. Pt has dizziness when getting up, multiple falls, +ortostatic vitals. TSH WNL. ECG showed prolonged QT  -ECHO pending  -Continue midodrine 2.5mg  -Monitor supine hypertension - elevate head of bead  -Drink 8oz cup of water before getting up slowly     Suspected foot fracture secondary to recent fall  Recent fall. Left foot pain and redness on dorsum of foot. No pain with passive movement. Unable to assess weight bearing.  No

## 2019-07-11 NOTE — ED NOTES
75mg of Carbidopa given as indicated by physician some for her night time medications.       Kd Navarrete RN  07/11/19 0000

## 2019-07-11 NOTE — ED PROVIDER NOTES
Ketones, Urine TRACE (A) Negative mg/dL    Specific Gravity, UA 1.010 1.005 - 1.030    Blood, Urine Negative Negative    pH, UA 8.5 (A) 5.0 - 8.0    Protein, UA 30 (A) Negative mg/dL    Urobilinogen, Urine 0.2 <2.0 E.U./dL    Nitrite, Urine Negative Negative    Leukocyte Esterase, Urine SMALL (A) Negative    Microscopic Examination YES     Urine Reflex to Culture Yes     Urine Type Not Specified    Magnesium   Result Value Ref Range    Magnesium 1.80 1.80 - 2.40 mg/dL   Microscopic Urinalysis   Result Value Ref Range    WBC, UA 6-10 (A) 0 - 5 /HPF    RBC, UA 0-2 0 - 2 /HPF    Epi Cells 0-2 /HPF    Bacteria, UA 4+ (A) /HPF    Amorphous, UA 2+ (A) /HPF   POCT Venous   Result Value Ref Range    pH, Peace 7.572 (H) 7.350 - 7.450    pCO2, Peace 24.7 (L) 40.0 - 50.0 mm Hg    pO2, Peace 58 Not Established mm Hg    HCO3, Venous 22.8 (L) 23.0 - 29.0 mmol/L    Base Excess, Peace 1 -3 - 3    O2 Sat, Peace 94 Not Established %    TC02 (Calc), Peace 24 Not Established mmol/L    Lactate 1.56 0.40 - 2.00 mmol/L    Sample Type PEACE     Performed on SEE BELOW        ED BEDSIDE ULTRASOUND:      RECENT VITALS:  BP: 114/61, Temp: 98.1 °F (36.7 °C), Pulse: 76,Resp: 18, SpO2: 96 %     Procedures         ED Course     Nursing Notes, Past Medical Hx, Past Surgical Hx, Social Hx, Allergies, and Family Hx were reviewed. The patient was given the followingmedications:  Orders Placed This Encounter   Medications    lactated ringers infusion 1,000 mL    carbidopa-levodopa (SINEMET)  MG per tablet 4 tablet    escitalopram (LEXAPRO) tablet 20 mg    nortriptyline (PAMELOR) capsule 75 mg    QUEtiapine (SEROQUEL) tablet 300 mg    cefTRIAXone (ROCEPHIN) 1 g IVPB in 50 mL D5W minibag    escitalopram (LEXAPRO) tablet 10 mg    irbesartan-hydrochlorothiazide (AVALIDE) 300-12.5 MG per tablet 1 tablet     Order Specific Question:   Please select a reason the therapeutic interchange was not accepted:      Answer:   Onelia Hodge for Pharmacy to Substitute with

## 2019-07-11 NOTE — PROGRESS NOTES
4 Eyes Admission Assessment     I agree as the admission nurse that 2 RN's have performed a thorough Head to Toe Skin Assessment on the patient. ALL assessment sites listed below have been assessed on admission. Areas assessed by both nurses: yes  [x]   Head, Face, and Ears   [x]   Shoulders, Back, and Chest  [x]   Arms, Elbows, and Hands   [x]   Coccyx, Sacrum, and Ischum  [x]   Legs, Feet, and Heels        Does the Patient have Skin Breakdown?   No         Ministerio Prevention initiated:  No   Wound Care Orders initiated:  No      [facility] nurse consulted for Pressure Injury (Stage 3,4, Unstageable, DTI, NWPT, and Complex wounds):  No      Nurse 1 eSignature: Electronically signed by Mirella Prater RN on 7/11/19 at 2:12 AM    **SHARE this note so that the co-signing nurse is able to place an eSignature**    Nurse 2 eSignature: Electronically signed by Cristhian Stroud RN on 7/13/19 at 2:26 AM

## 2019-07-11 NOTE — PROGRESS NOTES
Admitted this morning with a sepsis secondary to gram-negative kathya urinary tract infection(increased WBC, confusion and UTI)   Receiving IV antibiotics, follow-up urine culture. Parkinson disease on Sinemet with advanced symptoms likely autonomic dysfunction, multiple falls with orthostatic hypotension. Supportive care for now. PT OT evaluation, discharge back to skilled nursing when she is medically stable.

## 2019-07-12 LAB
ANION GAP SERPL CALCULATED.3IONS-SCNC: 12 MMOL/L (ref 3–16)
BASOPHILS ABSOLUTE: 0 K/UL (ref 0–0.2)
BASOPHILS RELATIVE PERCENT: 0.3 %
BUN BLDV-MCNC: 10 MG/DL (ref 7–20)
CALCIUM SERPL-MCNC: 9.3 MG/DL (ref 8.3–10.6)
CHLORIDE BLD-SCNC: 106 MMOL/L (ref 99–110)
CHLORIDE URINE RANDOM: 115 MMOL/L
CO2: 24 MMOL/L (ref 21–32)
CREAT SERPL-MCNC: 0.7 MG/DL (ref 0.6–1.2)
CREATININE URINE: 71 MG/DL (ref 28–259)
EKG ATRIAL RATE: 88 BPM
EKG DIAGNOSIS: NORMAL
EKG P AXIS: 58 DEGREES
EKG P-R INTERVAL: 202 MS
EKG Q-T INTERVAL: 388 MS
EKG QRS DURATION: 100 MS
EKG QTC CALCULATION (BAZETT): 469 MS
EKG R AXIS: -9 DEGREES
EKG T AXIS: 42 DEGREES
EKG VENTRICULAR RATE: 88 BPM
EOSINOPHILS ABSOLUTE: 0 K/UL (ref 0–0.6)
EOSINOPHILS RELATIVE PERCENT: 0.3 %
GFR AFRICAN AMERICAN: >60
GFR NON-AFRICAN AMERICAN: >60
GLUCOSE BLD-MCNC: 104 MG/DL (ref 70–99)
HCT VFR BLD CALC: 34.1 % (ref 36–48)
HEMOGLOBIN: 11.6 G/DL (ref 12–16)
LYMPHOCYTES ABSOLUTE: 1.3 K/UL (ref 1–5.1)
LYMPHOCYTES RELATIVE PERCENT: 15.5 %
MAGNESIUM: 1.9 MG/DL (ref 1.8–2.4)
MCH RBC QN AUTO: 29.8 PG (ref 26–34)
MCHC RBC AUTO-ENTMCNC: 34 G/DL (ref 31–36)
MCV RBC AUTO: 87.7 FL (ref 80–100)
MONOCYTES ABSOLUTE: 0.8 K/UL (ref 0–1.3)
MONOCYTES RELATIVE PERCENT: 9.5 %
NEUTROPHILS ABSOLUTE: 6.1 K/UL (ref 1.7–7.7)
NEUTROPHILS RELATIVE PERCENT: 74.4 %
PDW BLD-RTO: 14.2 % (ref 12.4–15.4)
PLATELET # BLD: 188 K/UL (ref 135–450)
PMV BLD AUTO: 9.3 FL (ref 5–10.5)
POTASSIUM REFLEX MAGNESIUM: 3 MMOL/L (ref 3.5–5.1)
POTASSIUM, UR: 55.9 MMOL/L
RBC # BLD: 3.89 M/UL (ref 4–5.2)
SODIUM BLD-SCNC: 142 MMOL/L (ref 136–145)
SODIUM URINE: 94 MMOL/L
WBC # BLD: 8.2 K/UL (ref 4–11)

## 2019-07-12 PROCEDURE — 80048 BASIC METABOLIC PNL TOTAL CA: CPT

## 2019-07-12 PROCEDURE — 93005 ELECTROCARDIOGRAM TRACING: CPT | Performed by: STUDENT IN AN ORGANIZED HEALTH CARE EDUCATION/TRAINING PROGRAM

## 2019-07-12 PROCEDURE — 6370000000 HC RX 637 (ALT 250 FOR IP): Performed by: INTERNAL MEDICINE

## 2019-07-12 PROCEDURE — 6370000000 HC RX 637 (ALT 250 FOR IP): Performed by: STUDENT IN AN ORGANIZED HEALTH CARE EDUCATION/TRAINING PROGRAM

## 2019-07-12 PROCEDURE — 2500000003 HC RX 250 WO HCPCS: Performed by: INTERNAL MEDICINE

## 2019-07-12 PROCEDURE — 82436 ASSAY OF URINE CHLORIDE: CPT

## 2019-07-12 PROCEDURE — 36415 COLL VENOUS BLD VENIPUNCTURE: CPT

## 2019-07-12 PROCEDURE — 84133 ASSAY OF URINE POTASSIUM: CPT

## 2019-07-12 PROCEDURE — 6360000002 HC RX W HCPCS: Performed by: INTERNAL MEDICINE

## 2019-07-12 PROCEDURE — 2580000003 HC RX 258: Performed by: STUDENT IN AN ORGANIZED HEALTH CARE EDUCATION/TRAINING PROGRAM

## 2019-07-12 PROCEDURE — 82570 ASSAY OF URINE CREATININE: CPT

## 2019-07-12 PROCEDURE — 85025 COMPLETE CBC W/AUTO DIFF WBC: CPT

## 2019-07-12 PROCEDURE — 93010 ELECTROCARDIOGRAM REPORT: CPT | Performed by: INTERNAL MEDICINE

## 2019-07-12 PROCEDURE — 6360000002 HC RX W HCPCS: Performed by: STUDENT IN AN ORGANIZED HEALTH CARE EDUCATION/TRAINING PROGRAM

## 2019-07-12 PROCEDURE — 1200000000 HC SEMI PRIVATE

## 2019-07-12 PROCEDURE — 83735 ASSAY OF MAGNESIUM: CPT

## 2019-07-12 PROCEDURE — 84300 ASSAY OF URINE SODIUM: CPT

## 2019-07-12 RX ORDER — RISPERIDONE 1 MG/1
1 TABLET, FILM COATED ORAL ONCE
Status: COMPLETED | OUTPATIENT
Start: 2019-07-12 | End: 2019-07-12

## 2019-07-12 RX ORDER — LORAZEPAM 2 MG/ML
1 INJECTION INTRAMUSCULAR ONCE
Status: COMPLETED | OUTPATIENT
Start: 2019-07-12 | End: 2019-07-12

## 2019-07-12 RX ORDER — QUETIAPINE FUMARATE 25 MG/1
25 TABLET, FILM COATED ORAL EVERY MORNING
Status: DISCONTINUED | OUTPATIENT
Start: 2019-07-12 | End: 2019-07-16 | Stop reason: HOSPADM

## 2019-07-12 RX ORDER — QUETIAPINE FUMARATE 25 MG/1
25 TABLET, FILM COATED ORAL EVERY MORNING
Status: DISCONTINUED | OUTPATIENT
Start: 2019-07-13 | End: 2019-07-12

## 2019-07-12 RX ORDER — DIMETHICONE, CAMPHOR (SYNTHETIC), MENTHOL, AND PHENOL 1.1; .5; .625; .5 G/100G; G/100G; G/100G; G/100G
OINTMENT TOPICAL PRN
Status: DISCONTINUED | OUTPATIENT
Start: 2019-07-12 | End: 2019-07-16 | Stop reason: HOSPADM

## 2019-07-12 RX ORDER — HALOPERIDOL 5 MG/ML
2 INJECTION INTRAMUSCULAR ONCE
Status: COMPLETED | OUTPATIENT
Start: 2019-07-12 | End: 2019-07-12

## 2019-07-12 RX ORDER — DEXTROSE, SODIUM CHLORIDE, AND POTASSIUM CHLORIDE 5; .45; .15 G/100ML; G/100ML; G/100ML
INJECTION INTRAVENOUS CONTINUOUS
Status: DISCONTINUED | OUTPATIENT
Start: 2019-07-12 | End: 2019-07-13

## 2019-07-12 RX ORDER — NORTRIPTYLINE HYDROCHLORIDE 25 MG/1
50 CAPSULE ORAL NIGHTLY
Status: DISCONTINUED | OUTPATIENT
Start: 2019-07-12 | End: 2019-07-12

## 2019-07-12 RX ORDER — QUETIAPINE FUMARATE 300 MG/1
300 TABLET, FILM COATED ORAL NIGHTLY
Status: DISCONTINUED | OUTPATIENT
Start: 2019-07-12 | End: 2019-07-12

## 2019-07-12 RX ORDER — LORAZEPAM 2 MG/ML
INJECTION INTRAMUSCULAR
Status: DISCONTINUED
Start: 2019-07-12 | End: 2019-07-12

## 2019-07-12 RX ORDER — NORTRIPTYLINE HYDROCHLORIDE 25 MG/1
75 CAPSULE ORAL NIGHTLY
Status: DISCONTINUED | OUTPATIENT
Start: 2019-07-12 | End: 2019-07-16 | Stop reason: HOSPADM

## 2019-07-12 RX ORDER — POTASSIUM CHLORIDE 7.45 MG/ML
10 INJECTION INTRAVENOUS ONCE
Status: COMPLETED | OUTPATIENT
Start: 2019-07-12 | End: 2019-07-12

## 2019-07-12 RX ORDER — SPIRONOLACTONE 50 MG/1
50 TABLET, FILM COATED ORAL NIGHTLY
Status: DISCONTINUED | OUTPATIENT
Start: 2019-07-12 | End: 2019-07-16 | Stop reason: HOSPADM

## 2019-07-12 RX ORDER — MIDODRINE HYDROCHLORIDE 5 MG/1
2.5 TABLET ORAL 2 TIMES DAILY WITH MEALS
Status: DISCONTINUED | OUTPATIENT
Start: 2019-07-12 | End: 2019-07-16 | Stop reason: HOSPADM

## 2019-07-12 RX ORDER — POTASSIUM CHLORIDE 7.45 MG/ML
10 INJECTION INTRAVENOUS
Status: DISPENSED | OUTPATIENT
Start: 2019-07-12 | End: 2019-07-12

## 2019-07-12 RX ORDER — HALOPERIDOL 5 MG/ML
INJECTION INTRAMUSCULAR
Status: DISCONTINUED
Start: 2019-07-12 | End: 2019-07-13

## 2019-07-12 RX ORDER — QUETIAPINE FUMARATE 300 MG/1
300 TABLET, FILM COATED ORAL NIGHTLY
Status: DISCONTINUED | OUTPATIENT
Start: 2019-07-12 | End: 2019-07-16 | Stop reason: HOSPADM

## 2019-07-12 RX ADMIN — Medication 1 G: at 12:12

## 2019-07-12 RX ADMIN — SODIUM CHLORIDE, POTASSIUM CHLORIDE, SODIUM LACTATE AND CALCIUM CHLORIDE: 600; 310; 30; 20 INJECTION, SOLUTION INTRAVENOUS at 11:14

## 2019-07-12 RX ADMIN — CARBIDOPA AND LEVODOPA 3 TABLET: 25; 100 TABLET ORAL at 17:56

## 2019-07-12 RX ADMIN — CARBIDOPA AND LEVODOPA 3 TABLET: 25; 100 TABLET ORAL at 12:56

## 2019-07-12 RX ADMIN — POTASSIUM CHLORIDE 10 MEQ: 7.46 INJECTION, SOLUTION INTRAVENOUS at 15:03

## 2019-07-12 RX ADMIN — Medication 1 G: at 17:56

## 2019-07-12 RX ADMIN — HALOPERIDOL LACTATE 2 MG: 5 INJECTION INTRAMUSCULAR at 15:46

## 2019-07-12 RX ADMIN — Medication 1 G: at 09:31

## 2019-07-12 RX ADMIN — POTASSIUM CHLORIDE 10 MEQ: 7.46 INJECTION, SOLUTION INTRAVENOUS at 11:08

## 2019-07-12 RX ADMIN — MIDODRINE HYDROCHLORIDE 2.5 MG: 5 TABLET ORAL at 14:13

## 2019-07-12 RX ADMIN — POTASSIUM CHLORIDE 10 MEQ: 7.46 INJECTION, SOLUTION INTRAVENOUS at 14:08

## 2019-07-12 RX ADMIN — FOLIC ACID 1 MG: 1 TABLET ORAL at 09:30

## 2019-07-12 RX ADMIN — POTASSIUM CHLORIDE 10 MEQ: 7.46 INJECTION, SOLUTION INTRAVENOUS at 12:12

## 2019-07-12 RX ADMIN — PANTOPRAZOLE SODIUM 40 MG: 40 TABLET, DELAYED RELEASE ORAL at 06:35

## 2019-07-12 RX ADMIN — RISPERIDONE 1 MG: 1 TABLET ORAL at 06:35

## 2019-07-12 RX ADMIN — CARBIDOPA AND LEVODOPA 3 TABLET: 25; 100 TABLET ORAL at 22:15

## 2019-07-12 RX ADMIN — NORTRIPTYLINE HYDROCHLORIDE 75 MG: 25 CAPSULE ORAL at 22:16

## 2019-07-12 RX ADMIN — SPIRONOLACTONE 50 MG: 50 TABLET ORAL at 22:15

## 2019-07-12 RX ADMIN — QUETIAPINE FUMARATE 25 MG: 25 TABLET ORAL at 15:02

## 2019-07-12 RX ADMIN — POTASSIUM CHLORIDE, DEXTROSE MONOHYDRATE AND SODIUM CHLORIDE: 150; 5; 450 INJECTION, SOLUTION INTRAVENOUS at 13:04

## 2019-07-12 RX ADMIN — MIDODRINE HYDROCHLORIDE 2.5 MG: 5 TABLET ORAL at 09:31

## 2019-07-12 RX ADMIN — ENOXAPARIN SODIUM 40 MG: 40 INJECTION SUBCUTANEOUS at 09:31

## 2019-07-12 RX ADMIN — QUETIAPINE FUMARATE 300 MG: 300 TABLET ORAL at 18:44

## 2019-07-12 RX ADMIN — POTASSIUM CHLORIDE 10 MEQ: 7.46 INJECTION, SOLUTION INTRAVENOUS at 16:56

## 2019-07-12 RX ADMIN — CARBIDOPA AND LEVODOPA 3 TABLET: 25; 100 TABLET ORAL at 09:31

## 2019-07-12 RX ADMIN — LORAZEPAM 1 MG: 2 INJECTION INTRAMUSCULAR; INTRAVENOUS at 04:56

## 2019-07-12 RX ADMIN — ESCITALOPRAM OXALATE 10 MG: 10 TABLET, FILM COATED ORAL at 22:15

## 2019-07-12 ASSESSMENT — PAIN SCALES - GENERAL
PAINLEVEL_OUTOF10: 0

## 2019-07-12 NOTE — PROGRESS NOTES
confused and trying to climb OOB. Unable to reorient. Will notify MD. Emotional support & reassurance given. Cont to monitor during hourly rounds  0456A: remains physically aggressive kicking and hitting. Trying to climb OOB and pulling at boyle catheter. Ativan 1mg given IVP. Cont to monitor during hourly rounds    0520R: Still confused, verbally and physically aggressive, combative, kicking, and hitting staff. 5 Staff members tried to comfort and reorient. Bi wrist restraints applied and MD notified per perfect serve. Cont to monitor during hourly rounds    0635 R: still trying to climb OOB, restless, & confused. Risperdal 1mg given PO. Cont to monitor during hourly rounds    0700R: Dr. Annalise Gaffney, pt's son in law, at bedside and notified about issues overnight & need for reapplying bi wrist restraints. He stated would fax medication list so home medications could be updated. He stated HTN isn't treated d/t being orthostatic and takes Midodrine 2.5mg at 8am and noon. Also, stated takes Seroquel 300mg at 8am, 25mg at 8am and can have 25mg x1 PRN.  Cont to monitor during hourly rounds

## 2019-07-12 NOTE — CONSULTS
Neurology Consult Note  Reason for Consult: parkinsons medication management  Chief complaint: UTI with sepsis  Dr Jamilah Blankenship MD asked me to see Long Arzola in consultation for evaluation of parkinsons disease    History of Present Illness:  Long Arzola is a 70 y.o. female who presents with increasing confusion found to have UTI with sepsis for which I was asked to see in regards to PD. At the time of my evaluation she was agitated and combative. She is followed by dr Alee Marie at the [facility] with diagnosis of idiopathic PD for more than 6 years as well as dysautonomia and psychiatric complications. Medical History:  Past Medical History:   Diagnosis Date    Hypertension     Parkinson disease ([address]      History reviewed. No pertinent surgical history. Medications Prior to Admission: carbidopa-levodopa (SINEMET)  MG per tablet, Take 3 tablets by mouth 4 times daily 0800 - 1200 - 1600 - 2000  L-Methylfolate 15 MG TABS, Take 15 mg by mouth daily  midodrine (PROAMATINE) 5 MG tablet, Take 2.5 mg by mouth 2 times daily Take with breakfast and with lunch  nortriptyline (PAMELOR) 25 MG capsule, Take 75 mg by mouth nightly  QUEtiapine (SEROQUEL) 200 MG tablet, Take 200 mg by mouth nightly Take 25mg qAM + 200mg qHS. Can take additional 25mg x1 as needed. QUEtiapine (SEROQUEL) 25 MG tablet, Take 25 mg by mouth every morning Take 25mg qAM + 200mg qHS. Can take additional 25mg x1 as needed.   sodium chloride 1 g tablet, Take 1 g by mouth 3 times daily  melatonin 3 MG TABS tablet, Take 3 mg by mouth nightly  escitalopram (LEXAPRO) 10 MG tablet, TAKE 1 TABLET BY MOUTH ONCE DAIALY  [DISCONTINUED] meloxicam (MOBIC) 7.5 MG tablet, Take 7.5 mg by mouth  [DISCONTINUED] omeprazole (PRILOSEC) 20 MG capsule,   [DISCONTINUED] irbesartan-hydrochlorothiazide (AVALIDE) 300-12.5 MG per tablet,   [DISCONTINUED] propranolol (INDERAL LA) 120 MG CR capsule,   [DISCONTINUED] escitalopram (LEXAPRO) 10 MG tablet, complications admitted with UTI and severe psychiatric symptoms. At the current time she is not severely parkinsonian but will need to continue her sinemet. She could likely titrate up on the seroquel some (outpatient dose appears to be 75/d) currently getting 25/300 but I am not sure that she has taken the 300. Recommendations:  I would try to avoid potent neuroleptics like haldol and risperdol if possible. Ativan prn for behaviors is likely best option  I would try to spread seroquel out. If she has been taking 300 qhs maybe try 50 q8h and titrate up from there as needed.     A copy of this note was provided for MD Elysia Resendez MD  [phone]  Evenings, weekends, and off weeks please discuss neurologist on-call

## 2019-07-12 NOTE — CARE COORDINATION
Spoke to son today (Dr. Nabil Vaughn MD). The family wants tthe patient followed by Dr. Eduardo Huddleston  (patient's personal nephrologist) to follow patient and the neurology group at [facility] to follow patient. Perfectserved Dr. Neal Lamb to let him know Jesse Dennison (patient's son) wanted to talk to him and to please put in consults for neurology and Dr. Martha Montes. Electronically signed by Cristian Mendoza RN on 7/12/2019 at 1:21 PM     Addendum: Dr. Neal Lamb Perfectserved this CM and now in patient's room and will be speaking to the son.  Electronically signed by Cristian Mendoza RN on 7/12/2019 at 1:24 PM

## 2019-07-12 NOTE — CONSULTS
Recent Labs     07/10/19  2217 07/11/19  0611 07/12/19  0723   WBC 13.2* 9.2 8.2   HGB 13.3 11.8* 11.6*   HCT 39.0 34.6* 34.1*    211 188     Recent Labs     07/10/19  2217 07/11/19  0611 07/12/19  0723    140 142   K 3.3* 3.5 3.0*    104 106   CO2 21 24 24   BUN 15 16 10   CREATININE 1.1 0.9 0.7   GLUCOSE 126* 113* 104*   MG 1.80 1.80 1.90      [facility]  [facility] [address], [address]  Office: [phone]  Cell: [phone]  Fax: [phone]

## 2019-07-12 NOTE — PROGRESS NOTES
Progress Note    Admit Date: 7/10/2019  Day: 3  Diet: Dietary Nutrition Supplements: Standard High Calorie Oral Supplement  DIET GENERAL;    CC: Confusion and falls    Interval history: Patient was delirious and combative overnight with paranoid thoughts. Seroquel was administered. Son Jennifer Crawford) informed and agreed to restraints. Son agreed to Ativan and Risperidone administration, following which the patient was calm and able to fall asleep. Patient seen and examined at bedside this afternoon. She is disoriented and combative with staff, requiring 3 people to watch her. Repeatedly attempting to get out of bed and \"go to a show in Maxwell's car. \" Medication reconciled with outpatient pharmacy. Dr. Roderick Alexandra was contacted and recommended that patient's nortriptyline be reduced from 75mg to 50mg nightly, however family wishes to continue her current home dose in hospital.    HPI: 71 yo F with PMHx of Parkinson's Disease, HTN, Degenerative Disk Disease, Hep B that was admitted for AMS 2/2 UTI and recent fall. She was brought to the ED my patients son who noticed that she was disoriented. Per that patient she had a fall earlier that afternoon at a community center. Before the fall she felt lightheaded. Patient reports that everything went black for a second and then came back quickly. She states that she hurt her right foot on this most recent fall. She reports having several falls in the past few months that have lead to various injuries on her hand and eye. She has noticed urinary frequency and urgency but no painful urination.  She does not report any fever, chills, headaches, chest pain, SOB, nausea or vomiting.         Medications:     Scheduled Meds:   haloperidol lactate        midodrine  2.5 mg Oral BID WC    spironolactone  50 mg Oral Nightly    QUEtiapine  25 mg Oral QAM    nortriptyline  75 mg Oral Nightly    QUEtiapine  300 mg Oral Nightly    escitalopram  10 mg Oral Nightly    pantoprazole  40 mg Oral QAM AC

## 2019-07-12 NOTE — CARE COORDINATION
Case Management Assessment           Initial Evaluation                Date / Time of Evaluation: 7/12/2019 3:52 PM                 Assessment Completed by: Randi Mercado    Patient Name: Zev Dunlap     YOB: 1940  Diagnosis: Urinary tract infection [N39.0]  Urinary tract infection [N39.0]     Date / Time: 7/10/2019  9:07 PM    Patient Admission Status: Inpatient    If patient is discharged prior to next notation, then this note serves as note for discharge by case management. Current PCP: [facility] [place] [facility]\"" Patient: No    Chart Reviewed: Yes  Patient/ Family Interviewed: Yes   Initial assessment completed at bedside with: PATIENT/ UNABLE TO COMPLETE  -patient unreliable historian    Hospitalization in the last 30 days: No    Emergency Contacts:  Extended Emergency Contact Information  Primary Emergency Contact: Dre Strong [address] Phone: [phone]  Relation: Child  Secondary Emergency Contact: Onur Buchanan [address] Phone: [phone]  Relation: Child    Advance Directives:   Code Status: Full Code      Financial  Payor: MEDICARE / Plan: MEDICARE PART A AND B / Product Type: *No Product type* /     Pre-cert required for SNF: No    Pharmacy    [address] [place] [id number]  Phone: [phone] Fax: [phone]      Potential assistance Purchasing Medications: Potential Assistance Purchasing Medications: No  Does Patient want to participate in local refill/ meds to beds program?: No    Meds To Beds General Rules:  1. Can ONLY be done Monday- Friday between 8:30am-5pm  2. Prescription(s) must be in pharmacy by 3pm to be filled same day  3. Copy of patient's insurance/ prescription drug card and patient face sheet must be sent along with the prescription(s)  4. Cost of Rx cannot be added to hospital bill.  If financial and physically especially with her hypotension issues of passing out. She is treated for her BP issues by Dr. Belkis Lara (nephrologist) and sees Dr. Gita Solano (neurologist) for her Parkinson's Plus ([address] Dr. Jason Paulson (attending) spending a lot of time today speaking to patient's family and medical team to come up with treatment goals and discharge goals. Family member Jesika Redman) stated that they were looking at [address] for placement when patient is stable enough. Lizett Dean and her family were provided with choice of provider; she and her family are in agreement with the discharge plan.     Care Transition patient: Sanjuana Mandel RN  [facility]  Case Management Department  Ph: [phone]

## 2019-07-12 NOTE — PLAN OF CARE
Nutrition Problem: Inadequate oral intake  Intervention: Food and/or Nutrient Delivery: Continue current diet, Continue current ONS  Nutritional Goals: Patient will consume 50% or greater of all meals and supplements

## 2019-07-13 LAB
ANION GAP SERPL CALCULATED.3IONS-SCNC: 10 MMOL/L (ref 3–16)
BASOPHILS ABSOLUTE: 0 K/UL (ref 0–0.2)
BASOPHILS RELATIVE PERCENT: 0.4 %
BUN BLDV-MCNC: 6 MG/DL (ref 7–20)
CALCIUM SERPL-MCNC: 9.7 MG/DL (ref 8.3–10.6)
CHLORIDE BLD-SCNC: 106 MMOL/L (ref 99–110)
CO2: 25 MMOL/L (ref 21–32)
CREAT SERPL-MCNC: 0.6 MG/DL (ref 0.6–1.2)
EKG ATRIAL RATE: 76 BPM
EKG DIAGNOSIS: NORMAL
EKG P AXIS: 56 DEGREES
EKG P-R INTERVAL: 194 MS
EKG Q-T INTERVAL: 400 MS
EKG QRS DURATION: 86 MS
EKG QTC CALCULATION (BAZETT): 450 MS
EKG R AXIS: 62 DEGREES
EKG T AXIS: 48 DEGREES
EKG VENTRICULAR RATE: 76 BPM
EOSINOPHILS ABSOLUTE: 0.1 K/UL (ref 0–0.6)
EOSINOPHILS RELATIVE PERCENT: 1.7 %
GFR AFRICAN AMERICAN: >60
GFR NON-AFRICAN AMERICAN: >60
GLUCOSE BLD-MCNC: 109 MG/DL (ref 70–99)
HCT VFR BLD CALC: 33.9 % (ref 36–48)
HEMOGLOBIN: 11.6 G/DL (ref 12–16)
LYMPHOCYTES ABSOLUTE: 1.6 K/UL (ref 1–5.1)
LYMPHOCYTES RELATIVE PERCENT: 22 %
MAGNESIUM: 1.9 MG/DL (ref 1.8–2.4)
MCH RBC QN AUTO: 29.7 PG (ref 26–34)
MCHC RBC AUTO-ENTMCNC: 34.2 G/DL (ref 31–36)
MCV RBC AUTO: 87 FL (ref 80–100)
MONOCYTES ABSOLUTE: 0.8 K/UL (ref 0–1.3)
MONOCYTES RELATIVE PERCENT: 11.3 %
NEUTROPHILS ABSOLUTE: 4.7 K/UL (ref 1.7–7.7)
NEUTROPHILS RELATIVE PERCENT: 64.6 %
ORGANISM: ABNORMAL
PDW BLD-RTO: 14.3 % (ref 12.4–15.4)
PLATELET # BLD: 184 K/UL (ref 135–450)
PMV BLD AUTO: 9 FL (ref 5–10.5)
POTASSIUM REFLEX MAGNESIUM: 3.5 MMOL/L (ref 3.5–5.1)
RBC # BLD: 3.89 M/UL (ref 4–5.2)
SODIUM BLD-SCNC: 141 MMOL/L (ref 136–145)
URINE CULTURE, ROUTINE: ABNORMAL
URINE CULTURE, ROUTINE: ABNORMAL
WBC # BLD: 7.3 K/UL (ref 4–11)

## 2019-07-13 PROCEDURE — 6370000000 HC RX 637 (ALT 250 FOR IP): Performed by: INTERNAL MEDICINE

## 2019-07-13 PROCEDURE — 97162 PT EVAL MOD COMPLEX 30 MIN: CPT

## 2019-07-13 PROCEDURE — 2500000003 HC RX 250 WO HCPCS: Performed by: INTERNAL MEDICINE

## 2019-07-13 PROCEDURE — 97116 GAIT TRAINING THERAPY: CPT

## 2019-07-13 PROCEDURE — 80048 BASIC METABOLIC PNL TOTAL CA: CPT

## 2019-07-13 PROCEDURE — 1200000000 HC SEMI PRIVATE

## 2019-07-13 PROCEDURE — 85025 COMPLETE CBC W/AUTO DIFF WBC: CPT

## 2019-07-13 PROCEDURE — 6370000000 HC RX 637 (ALT 250 FOR IP): Performed by: STUDENT IN AN ORGANIZED HEALTH CARE EDUCATION/TRAINING PROGRAM

## 2019-07-13 PROCEDURE — 2580000003 HC RX 258: Performed by: STUDENT IN AN ORGANIZED HEALTH CARE EDUCATION/TRAINING PROGRAM

## 2019-07-13 PROCEDURE — 83735 ASSAY OF MAGNESIUM: CPT

## 2019-07-13 PROCEDURE — 93010 ELECTROCARDIOGRAM REPORT: CPT | Performed by: INTERNAL MEDICINE

## 2019-07-13 PROCEDURE — 6360000002 HC RX W HCPCS: Performed by: STUDENT IN AN ORGANIZED HEALTH CARE EDUCATION/TRAINING PROGRAM

## 2019-07-13 PROCEDURE — 36415 COLL VENOUS BLD VENIPUNCTURE: CPT

## 2019-07-13 PROCEDURE — 93005 ELECTROCARDIOGRAM TRACING: CPT | Performed by: INTERNAL MEDICINE

## 2019-07-13 RX ADMIN — ENOXAPARIN SODIUM 40 MG: 40 INJECTION SUBCUTANEOUS at 09:01

## 2019-07-13 RX ADMIN — CARBIDOPA AND LEVODOPA 3 TABLET: 25; 100 TABLET ORAL at 17:41

## 2019-07-13 RX ADMIN — Medication 3 MG: at 20:29

## 2019-07-13 RX ADMIN — CEFTRIAXONE 1 G: 1 INJECTION, POWDER, FOR SOLUTION INTRAMUSCULAR; INTRAVENOUS at 23:32

## 2019-07-13 RX ADMIN — CARBIDOPA AND LEVODOPA 3 TABLET: 25; 100 TABLET ORAL at 20:29

## 2019-07-13 RX ADMIN — Medication 1 G: at 17:41

## 2019-07-13 RX ADMIN — MIDODRINE HYDROCHLORIDE 2.5 MG: 5 TABLET ORAL at 09:12

## 2019-07-13 RX ADMIN — ESCITALOPRAM OXALATE 10 MG: 10 TABLET, FILM COATED ORAL at 20:29

## 2019-07-13 RX ADMIN — QUETIAPINE FUMARATE 300 MG: 300 TABLET ORAL at 20:29

## 2019-07-13 RX ADMIN — PANTOPRAZOLE SODIUM 40 MG: 40 TABLET, DELAYED RELEASE ORAL at 09:17

## 2019-07-13 RX ADMIN — Medication 10 ML: at 20:30

## 2019-07-13 RX ADMIN — Medication 10 ML: at 09:02

## 2019-07-13 RX ADMIN — SPIRONOLACTONE 50 MG: 50 TABLET ORAL at 20:29

## 2019-07-13 RX ADMIN — FOLIC ACID 1 MG: 1 TABLET ORAL at 09:01

## 2019-07-13 RX ADMIN — CEFTRIAXONE 1 G: 1 INJECTION, POWDER, FOR SOLUTION INTRAMUSCULAR; INTRAVENOUS at 00:00

## 2019-07-13 RX ADMIN — CARBIDOPA AND LEVODOPA 3 TABLET: 25; 100 TABLET ORAL at 12:54

## 2019-07-13 RX ADMIN — Medication 1 G: at 09:02

## 2019-07-13 RX ADMIN — NORTRIPTYLINE HYDROCHLORIDE 75 MG: 25 CAPSULE ORAL at 20:29

## 2019-07-13 RX ADMIN — Medication 1 G: at 12:54

## 2019-07-13 RX ADMIN — POTASSIUM CHLORIDE, DEXTROSE MONOHYDRATE AND SODIUM CHLORIDE: 150; 5; 450 INJECTION, SOLUTION INTRAVENOUS at 11:10

## 2019-07-13 RX ADMIN — QUETIAPINE FUMARATE 25 MG: 25 TABLET ORAL at 09:01

## 2019-07-13 RX ADMIN — MIDODRINE HYDROCHLORIDE 2.5 MG: 5 TABLET ORAL at 12:54

## 2019-07-13 RX ADMIN — CARBIDOPA AND LEVODOPA 3 TABLET: 25; 100 TABLET ORAL at 09:01

## 2019-07-13 ASSESSMENT — PAIN SCALES - GENERAL
PAINLEVEL_OUTOF10: 0

## 2019-07-13 NOTE — PROGRESS NOTES
Physical Therapy    Facility/Department: [address]  Initial Assessment/Treatment Note    NAME: Ines Cadet  : 1940  MRN: [id number]    Date of Service: 2019    Discharge Recommendations:  Ines Cadet scored a 9/24 on the AM-PAC short mobility form. Current research shows that an AM-PAC score of 17 or less is typically not associated with a discharge to the patient's home setting. Based on the patients AM-PAC score and their current functional mobility deficits, it is recommended that the patient have 3-5 sessions per week of Physical Therapy at d/c to increase the patients independence. PT Equipment Recommendations  Other: defer    Assessment   Body structures, Functions, Activity limitations: Decreased functional mobility ; Decreased strength;Decreased coordination;Decreased safe awareness;Decreased cognition;Decreased balance  Assessment: Pt is a 70 y.o. female with diagnosis of UTI presenting with decreased functional mobility, strength, safety awareness, cognition, balance, and coordination. Pt is currently requiring assist of 1-2 for all functional mobility, which is a decline from reported baseline. Pt is currently unsafe to d/c home alone at this time and will benefit from d/c to SNF for increased safety to maximize independence to return to previous function. Treatment Diagnosis: decreased functional mobility due to UTI  Prognosis: Fair  Decision Making: Medium Complexity  Patient Education: Role of PT, goals, d/c recommendations, pt will require reinforcement  REQUIRES PT FOLLOW UP: Yes  Activity Tolerance  Activity Tolerance: Patient limited by cognitive status       Patient Diagnosis(es): The encounter diagnosis was Urinary tract infection without hematuria, site unspecified. has a past medical history of Hypertension and Parkinson disease ([facility] [address] has no past surgical history on file.     Restrictions  Position Activity Restriction  Other position/activity difficulty negotiating turns  Distance: 8' + 10' + 10' + 10'  Comments: Heavy verbal cues for sequencing, especially when turning. Stairs/Curb  Stairs?: No     Balance  Comments: CGA-Chula to maintain sitting balance EOB with x1 LOB posteriorly requiring Chula to correct. CGA-Chula for sitting balance on toilet due to pt leaning significantly to the R during pericare. ModA to maintain standing balance with BUE support on RW. Plan   Plan  Times per week: 2-5  Times per day: Daily  Current Treatment Recommendations: Strengthening, Transfer Training, Endurance Training, Neuromuscular Re-education, Patient/Caregiver Education & Training, ROM, Equipment Evaluation, Education, & procurement, Balance Training, Gait Training, Home Exercise Program, Functional Mobility Training, Stair training, Safety Education & Training  Safety Devices  Type of devices: Bed alarm in place, Call light within reach, Gait belt, Left in bed, Nurse notified, Sitter present    G-Code       OutComes Score                                                  AM-PAC Score  AM-PAC Inpatient Mobility Raw Score : 9 (07/13/19 0853)  AM-PAC Inpatient T-Scale Score : 30.55 (07/13/19 0853)  Mobility Inpatient CMS 0-100% Score: 81.38 (07/13/19 0853)  Mobility Inpatient CMS G-Code Modifier : CM (07/13/19 4198)          Goals  Short term goals  Time Frame for Short term goals: Discharge  Short term goal 1: Pt will perform all bed mobility with CGA  Short term goal 2: Pt will perform sit to/from stand with RW and Chula  Short term goal 3: Pt will ambulate 13' with RW and Chula  Patient Goals   Patient goals : Pt unable to state       Therapy Time   Individual Concurrent Group Co-treatment   Time In 0806         Time Out 0832         Minutes 26              Timed Code Treatment Minutes:   11    Total Treatment Minutes:  Georgette Pena 76, PT, DPT, CLT    This note to serve as discharge summary if patient discharged before next session.

## 2019-07-13 NOTE — CARE COORDINATION
JASS spoke with MD Daniel Zavala this AM. Patient may be ready for discharge Monday. SNF was recommended. JASS reviewed Chart and RN Martin Negrete reported  Family interested in [facility] for Short-term rehab and also interested in [facility] for placement after ([facility] does not provide short-term rehab. JASS sent referral in [facility] on this date and called and LVM with Chris Pace in admission sat [facility] at [phone]. JASS then called son, Martha Comer to verify referral and choice per notes. Lana Block He reported he wanted a referral to [facility]. JASS sent new referral to [facility]     CM team to follow.      Erin Franco, MSW, LSW     [facility]   [phone]

## 2019-07-13 NOTE — PROGRESS NOTES
Staff x 4 at bedside pt continues to be combative, agitated yelling at staff, residents now at bedside evaluating pt.

## 2019-07-13 NOTE — PROGRESS NOTES
DR Meghan Ga at bedside orders given to D/C restrains also notified him of pt BP. Staff remains at bedside.

## 2019-07-13 NOTE — PROGRESS NOTES
mention of hepatitis B  although surface antigen and core antibody negative, so that goes against hepatitis B. She does have surface antibody which mean immunity to hepatitis B.     Weight loss of 40 pounds   little concerning. Remote history of smoking,  this has to be monitored for any underlying malignancy                 [facility] would like to thank Jose Stewart MD   for opportunity to serve this patient      Please call with questions at-   24 Hrs Answering service [phone] or  7 am- 5 pm via Perfect serve or cell phone  [address] [address]          CC/reason for consult :     Hypokalemia and orthostatic hypotension     HPI :     Terese Romero is a 70 y.o. female presented to   the hospital on 7/10/2019 with altered mental status, hypotension and hypokalemia. She has past medical history of Parkinson's disease, hypertension with orthostatic hypotension, hepatitis B presented with multiple falls and increased confusion. She was seen by me in the room and is accompanied by a sitter. As per the nurse patient is been agitated and thrashing around. She is on four-point restraints. Most of the history is obtained by reviewing the chart and speaking to the staff. Patient has prior history of hypokalemia and chronic orthostatic hypotension. It is likely associated with Parkinson's. She takes ProAmatine. As per the family patient has been getting increasingly confused for the last 2 days. She also lost 40 pounds over the last 1 year. Appetite is poor. She denies any nausea vomiting abdominal pain hematochezia melena or hemoptysis. She was seen by my partner in June 2019 for follow-up visit. As per the note from the office she continued to have recurrent falls which are related to orthostatic hypotension. She was to told to take ProAmatine in the morning as well as at noon but avoid taking it at night as she becomes hypertensive in supine position.     Hypertension work-up in the past including 24-hour urine for metanephrines, aldosterone and renin was normal.  Cosyntropin test was normal.  She was told to stop PPI for hypokalemia. She was taken off Procardia and losartan due to Orthostatic hypotension    I was called for further management of orthostatic hypotension. Interval History:     She is on IV fluid with potassium      ROS:     Seen with-nurse and resident    positives in bold   Not obtained due to patient mental status        PMH/PSH/SH/Family History:     Past Medical History:   Diagnosis Date    Hypertension     Parkinson disease ([facility] [address]        History reviewed. No pertinent surgical history. Social History     Socioeconomic History    Marital status:       Spouse name: Not on file    Number of children: Not on file    Years of education: Not on file    Highest education level: Not on file   Occupational History    Not on file   Social Needs    Financial resource strain: Not on file    Food insecurity:     Worry: Not on file     Inability: Not on file    Transportation needs:     Medical: Not on file     Non-medical: Not on file   Tobacco Use    Smoking status: Never Smoker    Smokeless tobacco: Never Used   Substance and Sexual Activity    Alcohol use: Not Currently     Alcohol/week: 1.0 standard drinks     Types: 1 Standard drinks or equivalent per week    Drug use: No    Sexual activity: Not on file   Lifestyle    Physical activity:     Days per week: Not on file     Minutes per session: Not on file    Stress: Not on file   Relationships    Social connections:     Talks on phone: Not on file     Gets together: Not on file     Attends [employer] service: Not on file     Active member of club or organization: Not on file     Attends meetings of clubs or organizations: Not on file     Relationship status: Not on file    Intimate partner violence:     Fear of current or ex partner: Not on file     Emotionally abused: Not on file     Physically

## 2019-07-14 LAB
ANION GAP SERPL CALCULATED.3IONS-SCNC: 11 MMOL/L (ref 3–16)
BASOPHILS ABSOLUTE: 0.1 K/UL (ref 0–0.2)
BASOPHILS RELATIVE PERCENT: 0.8 %
BUN BLDV-MCNC: 16 MG/DL (ref 7–20)
CALCIUM SERPL-MCNC: 10 MG/DL (ref 8.3–10.6)
CHLORIDE BLD-SCNC: 104 MMOL/L (ref 99–110)
CO2: 25 MMOL/L (ref 21–32)
CREAT SERPL-MCNC: 0.6 MG/DL (ref 0.6–1.2)
EKG ATRIAL RATE: 75 BPM
EKG DIAGNOSIS: NORMAL
EKG P AXIS: 34 DEGREES
EKG P-R INTERVAL: 204 MS
EKG Q-T INTERVAL: 408 MS
EKG QRS DURATION: 96 MS
EKG QTC CALCULATION (BAZETT): 455 MS
EKG R AXIS: 4 DEGREES
EKG T AXIS: 8 DEGREES
EKG VENTRICULAR RATE: 75 BPM
EOSINOPHILS ABSOLUTE: 0.2 K/UL (ref 0–0.6)
EOSINOPHILS RELATIVE PERCENT: 3.3 %
GFR AFRICAN AMERICAN: >60
GFR NON-AFRICAN AMERICAN: >60
GLUCOSE BLD-MCNC: 112 MG/DL (ref 70–99)
HCT VFR BLD CALC: 34.9 % (ref 36–48)
HEMOGLOBIN: 11.8 G/DL (ref 12–16)
LYMPHOCYTES ABSOLUTE: 2.3 K/UL (ref 1–5.1)
LYMPHOCYTES RELATIVE PERCENT: 33.5 %
MCH RBC QN AUTO: 29.8 PG (ref 26–34)
MCHC RBC AUTO-ENTMCNC: 33.8 G/DL (ref 31–36)
MCV RBC AUTO: 88.2 FL (ref 80–100)
MONOCYTES ABSOLUTE: 0.7 K/UL (ref 0–1.3)
MONOCYTES RELATIVE PERCENT: 9.8 %
NEUTROPHILS ABSOLUTE: 3.6 K/UL (ref 1.7–7.7)
NEUTROPHILS RELATIVE PERCENT: 52.6 %
PDW BLD-RTO: 14.5 % (ref 12.4–15.4)
PLATELET # BLD: 195 K/UL (ref 135–450)
PMV BLD AUTO: 8.9 FL (ref 5–10.5)
POTASSIUM REFLEX MAGNESIUM: 4.1 MMOL/L (ref 3.5–5.1)
RBC # BLD: 3.96 M/UL (ref 4–5.2)
SODIUM BLD-SCNC: 140 MMOL/L (ref 136–145)
WBC # BLD: 6.8 K/UL (ref 4–11)

## 2019-07-14 PROCEDURE — 85025 COMPLETE CBC W/AUTO DIFF WBC: CPT

## 2019-07-14 PROCEDURE — 1200000000 HC SEMI PRIVATE

## 2019-07-14 PROCEDURE — 93005 ELECTROCARDIOGRAM TRACING: CPT | Performed by: INTERNAL MEDICINE

## 2019-07-14 PROCEDURE — 6370000000 HC RX 637 (ALT 250 FOR IP): Performed by: INTERNAL MEDICINE

## 2019-07-14 PROCEDURE — 2580000003 HC RX 258: Performed by: INTERNAL MEDICINE

## 2019-07-14 PROCEDURE — 6370000000 HC RX 637 (ALT 250 FOR IP): Performed by: STUDENT IN AN ORGANIZED HEALTH CARE EDUCATION/TRAINING PROGRAM

## 2019-07-14 PROCEDURE — 6360000002 HC RX W HCPCS: Performed by: STUDENT IN AN ORGANIZED HEALTH CARE EDUCATION/TRAINING PROGRAM

## 2019-07-14 PROCEDURE — 80048 BASIC METABOLIC PNL TOTAL CA: CPT

## 2019-07-14 PROCEDURE — 2580000003 HC RX 258: Performed by: STUDENT IN AN ORGANIZED HEALTH CARE EDUCATION/TRAINING PROGRAM

## 2019-07-14 PROCEDURE — 93010 ELECTROCARDIOGRAM REPORT: CPT | Performed by: INTERNAL MEDICINE

## 2019-07-14 PROCEDURE — 6360000002 HC RX W HCPCS: Performed by: INTERNAL MEDICINE

## 2019-07-14 PROCEDURE — 36415 COLL VENOUS BLD VENIPUNCTURE: CPT

## 2019-07-14 RX ORDER — CIPROFLOXACIN 500 MG/1
500 TABLET, FILM COATED ORAL EVERY 12 HOURS SCHEDULED
Status: DISCONTINUED | OUTPATIENT
Start: 2019-07-14 | End: 2019-07-14

## 2019-07-14 RX ORDER — CALCIUM CARBONATE 200(500)MG
500 TABLET,CHEWABLE ORAL 3 TIMES DAILY PRN
Status: DISCONTINUED | OUTPATIENT
Start: 2019-07-14 | End: 2019-07-15

## 2019-07-14 RX ADMIN — Medication 1 G: at 12:22

## 2019-07-14 RX ADMIN — CARBIDOPA AND LEVODOPA 3 TABLET: 25; 100 TABLET ORAL at 20:42

## 2019-07-14 RX ADMIN — Medication 1 G: at 16:56

## 2019-07-14 RX ADMIN — NORTRIPTYLINE HYDROCHLORIDE 75 MG: 25 CAPSULE ORAL at 20:42

## 2019-07-14 RX ADMIN — CARBIDOPA AND LEVODOPA 3 TABLET: 25; 100 TABLET ORAL at 16:56

## 2019-07-14 RX ADMIN — Medication 10 ML: at 08:23

## 2019-07-14 RX ADMIN — PANTOPRAZOLE SODIUM 40 MG: 40 TABLET, DELAYED RELEASE ORAL at 06:11

## 2019-07-14 RX ADMIN — QUETIAPINE FUMARATE 300 MG: 300 TABLET ORAL at 20:43

## 2019-07-14 RX ADMIN — ANTACID TABLETS 500 MG: 500 TABLET, CHEWABLE ORAL at 20:42

## 2019-07-14 RX ADMIN — ESCITALOPRAM OXALATE 10 MG: 10 TABLET, FILM COATED ORAL at 21:11

## 2019-07-14 RX ADMIN — CARBIDOPA AND LEVODOPA 3 TABLET: 25; 100 TABLET ORAL at 12:22

## 2019-07-14 RX ADMIN — CEFTRIAXONE 1 G: 1 INJECTION, POWDER, FOR SOLUTION INTRAMUSCULAR; INTRAVENOUS at 08:21

## 2019-07-14 RX ADMIN — FOLIC ACID 1 MG: 1 TABLET ORAL at 08:22

## 2019-07-14 RX ADMIN — Medication 10 ML: at 20:48

## 2019-07-14 RX ADMIN — SPIRONOLACTONE 50 MG: 50 TABLET ORAL at 20:42

## 2019-07-14 RX ADMIN — CARBIDOPA AND LEVODOPA 3 TABLET: 25; 100 TABLET ORAL at 08:22

## 2019-07-14 RX ADMIN — MIDODRINE HYDROCHLORIDE 2.5 MG: 5 TABLET ORAL at 08:22

## 2019-07-14 RX ADMIN — Medication 1 G: at 09:24

## 2019-07-14 RX ADMIN — ENOXAPARIN SODIUM 40 MG: 40 INJECTION SUBCUTANEOUS at 08:21

## 2019-07-14 RX ADMIN — QUETIAPINE FUMARATE 25 MG: 25 TABLET ORAL at 08:22

## 2019-07-14 ASSESSMENT — PAIN DESCRIPTION - PROGRESSION
CLINICAL_PROGRESSION: NOT CHANGED
CLINICAL_PROGRESSION: NOT CHANGED

## 2019-07-14 ASSESSMENT — PAIN SCALES - GENERAL
PAINLEVEL_OUTOF10: 3
PAINLEVEL_OUTOF10: 0

## 2019-07-14 ASSESSMENT — PAIN DESCRIPTION - LOCATION: LOCATION: ABDOMEN

## 2019-07-14 ASSESSMENT — PAIN DESCRIPTION - FREQUENCY: FREQUENCY: INTERMITTENT

## 2019-07-14 ASSESSMENT — PAIN DESCRIPTION - DESCRIPTORS: DESCRIPTORS: BURNING

## 2019-07-14 ASSESSMENT — PAIN - FUNCTIONAL ASSESSMENT: PAIN_FUNCTIONAL_ASSESSMENT: ACTIVITIES ARE NOT PREVENTED

## 2019-07-14 ASSESSMENT — PAIN DESCRIPTION - ONSET: ONSET: GRADUAL

## 2019-07-14 ASSESSMENT — PAIN DESCRIPTION - ORIENTATION: ORIENTATION: MID

## 2019-07-14 ASSESSMENT — PAIN DESCRIPTION - PAIN TYPE: TYPE: ACUTE PAIN;CHRONIC PAIN

## 2019-07-14 NOTE — CONSULTS
Full noted dictated. Longstanding depression, intermittently throughout her life. Possible history of psychosis secondary to PD and/or dopamine agonists. Currently with delirium secondary to urosepsis, appears to be improving  Rule out neurocognitive disorder secondary to PD and/or other factors. 2 of her psychotropics could be exacerbating her orthostasis (seroquel and ntp). Patient sees Dr. Jaqueline Desai for psychiatric care, and best not to attempt major changes here, given how chronic and complicated her issues are. Reviewed records, including [facility], and it appears that there is communication between Dr Ramírez Connor and Jaqueline Desai. Left message with her son, (pediatrician). If possible, would be helpful to have psych followup at SNF if available.

## 2019-07-14 NOTE — PROGRESS NOTES
Progress Note    Admit Date: 7/10/2019  Day: 3  Diet: Dietary Nutrition Supplements: Standard High Calorie Oral Supplement  DIET GENERAL;    CC: Confusion and falls    Interval history: CORY    Met pt at bedside. She was AAOx3. Has not had a BM in a couple days. No fever or chills. No HA, lightheadedness or dizziness. No vision changes. No CP, chest tightness or palpitations. No abdominal pain. No SOB. Overnight: hypertensive (-177), afebrile, sating 95-96 on RA    Dc boyle  Per Nephro: stop IVF, compression stockings, spironolactone QHS, proamatine 2.5 BID  Per neurology: avoid neuroleptics, ativan PRN, seroquel spread out instead of at once    PT 9/24    HPI: 69 yo F with PMHx of Parkinson's Disease, HTN, DDD, Hep B that was admitted for AMS 2/2 UTI and recent fall. She was brought to the ED by patients son who noticed that she was disoriented. Per that patient she had a fall earlier that afternoon at a community center. Before the fall she felt lightheaded. Patient reports that everything went black for a second and then came back quickly. She states that she hurt her right foot on this most recent fall. She reports having several falls in the past few months that have lead to various injuries on her hand and eye. She has noticed urinary frequency and urgency but no painful urination.  She does not report any fever, chills, headaches, chest pain, SOB, nausea or vomiting.     Medications:     Scheduled Meds:   cefTRIAXone (ROCEPHIN) IV  1 g Intravenous Q24H    midodrine  2.5 mg Oral BID WC    spironolactone  50 mg Oral Nightly    QUEtiapine  25 mg Oral QAM    nortriptyline  75 mg Oral Nightly    QUEtiapine  300 mg Oral Nightly    escitalopram  10 mg Oral Nightly    pantoprazole  40 mg Oral QAM AC    sodium chloride flush  10 mL Intravenous 2 times per day    enoxaparin  40 mg Subcutaneous Daily    carbidopa-levodopa  3 tablet Oral 4x Daily    folic acid  1 mg Oral Daily    sodium 11.6* 11.6* 11.8*   HCT 34.1* 33.9* 34.9*    184 195   MCV 87.7 87.0 88.2     Renal:    Recent Labs     07/12/19  0723 07/13/19  0727 07/14/19  0610    141 140   K 3.0* 3.5 4.1    106 104   CO2 24 25 25   BUN 10 6* 16   CREATININE 0.7 0.6 0.6   GLUCOSE 104* 109* 112*   CALCIUM 9.3 9.7 10.0   MG 1.90 1.90  --    ANIONGAP 12 10 11     Hepatic:   No results for input(s): AST, ALT, BILITOT, BILIDIR, PROT, LABALBU, ALKPHOS in the last 72 hours. Troponin:   No results for input(s): TROPONINI in the last 72 hours. BNP: No results for input(s): BNP in the last 72 hours. Lipids: No results for input(s): CHOL, HDL in the last 72 hours. Invalid input(s): LDLCALCU, TRIGLYCERIDE  ABGs:  No results for input(s): PHART, ISO1VJF, PO2ART, MZS4SIE, BEART, THGBART, Y2SOITCK, HPM8FOT in the last 72 hours. INR: No results for input(s): INR in the last 72 hours. Lactate:   No results for input(s): LACTATE in the last 72 hours. Cultures:  -----------------------------------------------------------------  RAD:   XR FOOT LEFT (MIN 3 VIEWS)   Final Result   1. Degenerative midfoot osteoarthritis. CT Head WO Contrast   Final Result      1. No acute intracranial process. 2. Mild cerebral atrophy. 3. Mild chronic small vessel ischemic disease. XR CHEST STANDARD (2 VW)   Final Result      No acute pulmonary disease. Mild cardiomegaly. Assessment/Plan:   78yo F with PMHx of Parkinson's Disease, HTN, Degenerative Disk Disease, Hep B that was admitted for AMS 2/2 UTI and recent fall.     Sepsis - 2/2 UTI   Patient confused, + urgency & frequency, no dysuria. Not febrile. Leukocytosis improved from 13.2 on admission. UA showed 6-10 wbc/hpf, 4+bacteira, small leukocyte esterase. Pro-calcitonin 0.08. Blood culture NGTD. Urine culture shows proteus mirabilis with >100,000 CFU/mL.   -Continue Rocephin  -IVF     Orthostatic Hypotension 2/2 autonomic dysfunction associated with Parkinson's Disease. Pt has dizziness when getting up, multiple falls, +ortostatic vitals. TSH WNL. ECG showed prolonged QTc. Resolved to QTc 469.  -ECHO pending  -Continue midodrine 2.5mg BID  -Drink 8oz cup of water before getting up slowly  -Sodium chloride tablet  -Per Nephro: stop IVF, compression stockings, spironolactone QHS, proamatine 2.5 BID    Supine Hypertension  24-hr urine metanephrines, aldosterone and renin wnl.  - Elevate HOB  - Aldactone nightly     Acute metabolic encephalopathy - likely 2/2 UTI  Hx of advanced dementia 2/2 Parkinsons  Requiring restraints and antipsychotics and benzos. - Psychiatry consulted - appreciate recs  - cont seroquel     Parkinson's Disease  Neurologist Dr. Peraza  -Sinemet 100mg tablets - 3 tablets 4 times a day (8am, 1pm, 5pm, 8pm)  -Folic acid 1mg daily  -Nortripryline 75mg nightly  -Seroquel 300mg nightly and 25mg morning  -Continue Telemetry  -Per neurology: avoid neuroleptics, ativan PRN, seroquel spread out instead of at once  -PT/OT - SNF after discharge     Suspected foot fracture secondary to recent fall  Recent fall. Left foot pain and redness on dorsum of foot. No pain with passive movement. Unable to assess weight bearing. No edema. Xray negative for fractures or dislocations.   - Resolved    Code Status: Full Code   FEN: General diet  PPX: Lovenox  DISPO: Rachana Anguiano MD, PGY-1  07/14/19  11:33 AM    This patient has been staffed and discussed with Shruthi Ocasio MD.

## 2019-07-14 NOTE — PROGRESS NOTES
Addendum to the Resident  notes. Pt seen,examined and evaluated at bed side with the  resident. I have reviewed the current history, physical findings, labs and assessment and plan and agree with note as documented . Admitted 7.11.19  morning with a sepsis secondary to gram-negative kathya urinary tract infection(increased WBC, confusion and UTI)   Acute metabolic encephalopathy in setting of UTI and advanced dementia with Parkinson's  Receiving IV antibiotics, Proteus on urine culture, will not give Cipro in view of 2 drugs which might contribute to QT prolongation  Parkinson disease on Sinemet with advanced symptoms - autonomic dysfunction, multiple falls with orthostatic hypotension. Supportive care for now. Undiagnosed dementia, dementia with behavioral problems and sundowning. Continue home Seroquel medication dose adjusted 7.12.19 after phone call with Dr. Rosa Elena Saleh son)  PT OT evaluation, discharge back to skilled nursing when she is medically stable. Overall in the last 6 months she has deteriorated but in the last 1 month it was very steady according to her son Dr. Wallslaura SmithCatarino. Appreciate nephrology and neurology evaluation to better understand her orthostatics and her Parkinson disease. Spoke with Dr. Richard Casas and Dr. Barnes Kos  Unfortunately her symptoms might have exacerbated in view of active urinary tract infections on top of slowly progression over the last few months. Will remove Yancey catheter today, off restraints in the last 24 hours, sitter at bedside. Plan d/w pt/family and RN at bed side. Dispo:   inpt  Herminio Oliva M.D

## 2019-07-14 NOTE — PROGRESS NOTES
07/14/19  0610   WBC 8.2 7.3 6.8   HGB 11.6* 11.6* 11.8*   HCT 34.1* 33.9* 34.9*    184 195     Recent Labs     07/12/19  0723 07/13/19  0727 07/14/19  0610    141 140   K 3.0* 3.5 4.1    106 104   CO2 24 25 25   BUN 10 6* 16   CREATININE 0.7 0.6 0.6   GLUCOSE 104* 109* 112*   MG 1.90 1.90  --       Nephrology  Le[address], [address]  Office: [phone]  Cell: [phone]  Fax: [phone]

## 2019-07-15 LAB
ANION GAP SERPL CALCULATED.3IONS-SCNC: 12 MMOL/L (ref 3–16)
BASOPHILS ABSOLUTE: 0 K/UL (ref 0–0.2)
BASOPHILS RELATIVE PERCENT: 0.7 %
BUN BLDV-MCNC: 13 MG/DL (ref 7–20)
CALCIUM SERPL-MCNC: 10.3 MG/DL (ref 8.3–10.6)
CHLORIDE BLD-SCNC: 102 MMOL/L (ref 99–110)
CO2: 25 MMOL/L (ref 21–32)
CREAT SERPL-MCNC: 0.7 MG/DL (ref 0.6–1.2)
EKG ATRIAL RATE: 74 BPM
EKG DIAGNOSIS: NORMAL
EKG P AXIS: 41 DEGREES
EKG P-R INTERVAL: 204 MS
EKG Q-T INTERVAL: 394 MS
EKG QRS DURATION: 94 MS
EKG QTC CALCULATION (BAZETT): 437 MS
EKG R AXIS: 38 DEGREES
EKG T AXIS: 20 DEGREES
EKG VENTRICULAR RATE: 74 BPM
EOSINOPHILS ABSOLUTE: 0.2 K/UL (ref 0–0.6)
EOSINOPHILS RELATIVE PERCENT: 3.4 %
GFR AFRICAN AMERICAN: >60
GFR NON-AFRICAN AMERICAN: >60
GLUCOSE BLD-MCNC: 96 MG/DL (ref 70–99)
HCT VFR BLD CALC: 37.4 % (ref 36–48)
HEMOGLOBIN: 12.5 G/DL (ref 12–16)
LYMPHOCYTES ABSOLUTE: 1.4 K/UL (ref 1–5.1)
LYMPHOCYTES RELATIVE PERCENT: 21.6 %
MCH RBC QN AUTO: 29.5 PG (ref 26–34)
MCHC RBC AUTO-ENTMCNC: 33.3 G/DL (ref 31–36)
MCV RBC AUTO: 88.6 FL (ref 80–100)
MONOCYTES ABSOLUTE: 0.6 K/UL (ref 0–1.3)
MONOCYTES RELATIVE PERCENT: 8.5 %
NEUTROPHILS ABSOLUTE: 4.3 K/UL (ref 1.7–7.7)
NEUTROPHILS RELATIVE PERCENT: 65.8 %
PDW BLD-RTO: 14.5 % (ref 12.4–15.4)
PLATELET # BLD: 213 K/UL (ref 135–450)
PMV BLD AUTO: 8.9 FL (ref 5–10.5)
POTASSIUM REFLEX MAGNESIUM: 3.9 MMOL/L (ref 3.5–5.1)
RBC # BLD: 4.22 M/UL (ref 4–5.2)
SODIUM BLD-SCNC: 139 MMOL/L (ref 136–145)
WBC # BLD: 6.5 K/UL (ref 4–11)

## 2019-07-15 PROCEDURE — 93010 ELECTROCARDIOGRAM REPORT: CPT | Performed by: INTERNAL MEDICINE

## 2019-07-15 PROCEDURE — 6370000000 HC RX 637 (ALT 250 FOR IP): Performed by: INTERNAL MEDICINE

## 2019-07-15 PROCEDURE — 85025 COMPLETE CBC W/AUTO DIFF WBC: CPT

## 2019-07-15 PROCEDURE — 2580000003 HC RX 258: Performed by: INTERNAL MEDICINE

## 2019-07-15 PROCEDURE — 97165 OT EVAL LOW COMPLEX 30 MIN: CPT

## 2019-07-15 PROCEDURE — 36415 COLL VENOUS BLD VENIPUNCTURE: CPT

## 2019-07-15 PROCEDURE — 6370000000 HC RX 637 (ALT 250 FOR IP): Performed by: STUDENT IN AN ORGANIZED HEALTH CARE EDUCATION/TRAINING PROGRAM

## 2019-07-15 PROCEDURE — 2580000003 HC RX 258: Performed by: STUDENT IN AN ORGANIZED HEALTH CARE EDUCATION/TRAINING PROGRAM

## 2019-07-15 PROCEDURE — 93005 ELECTROCARDIOGRAM TRACING: CPT | Performed by: INTERNAL MEDICINE

## 2019-07-15 PROCEDURE — 6360000002 HC RX W HCPCS: Performed by: INTERNAL MEDICINE

## 2019-07-15 PROCEDURE — 97535 SELF CARE MNGMENT TRAINING: CPT

## 2019-07-15 PROCEDURE — 6360000002 HC RX W HCPCS: Performed by: STUDENT IN AN ORGANIZED HEALTH CARE EDUCATION/TRAINING PROGRAM

## 2019-07-15 PROCEDURE — 80048 BASIC METABOLIC PNL TOTAL CA: CPT

## 2019-07-15 PROCEDURE — 1200000000 HC SEMI PRIVATE

## 2019-07-15 RX ORDER — CEFDINIR 300 MG/1
300 CAPSULE ORAL EVERY 12 HOURS SCHEDULED
Status: DISCONTINUED | OUTPATIENT
Start: 2019-07-15 | End: 2019-07-16 | Stop reason: HOSPADM

## 2019-07-15 RX ORDER — FAMOTIDINE 20 MG/1
20 TABLET, FILM COATED ORAL DAILY
Status: DISCONTINUED | OUTPATIENT
Start: 2019-07-15 | End: 2019-07-16 | Stop reason: HOSPADM

## 2019-07-15 RX ORDER — INDOMETHACIN 50 MG/1
50 CAPSULE ORAL
Status: DISCONTINUED | OUTPATIENT
Start: 2019-07-15 | End: 2019-07-16 | Stop reason: HOSPADM

## 2019-07-15 RX ADMIN — NORTRIPTYLINE HYDROCHLORIDE 75 MG: 25 CAPSULE ORAL at 21:03

## 2019-07-15 RX ADMIN — SPIRONOLACTONE 50 MG: 50 TABLET ORAL at 21:04

## 2019-07-15 RX ADMIN — CEFDINIR 300 MG: 300 CAPSULE ORAL at 21:05

## 2019-07-15 RX ADMIN — FOLIC ACID 1 MG: 1 TABLET ORAL at 08:43

## 2019-07-15 RX ADMIN — QUETIAPINE FUMARATE 300 MG: 300 TABLET ORAL at 21:26

## 2019-07-15 RX ADMIN — Medication 1 G: at 08:44

## 2019-07-15 RX ADMIN — Medication 1 G: at 18:09

## 2019-07-15 RX ADMIN — CARBIDOPA AND LEVODOPA 3 TABLET: 25; 100 TABLET ORAL at 18:09

## 2019-07-15 RX ADMIN — INDOMETHACIN 50 MG: 50 CAPSULE ORAL at 18:10

## 2019-07-15 RX ADMIN — FAMOTIDINE 20 MG: 20 TABLET, FILM COATED ORAL at 11:15

## 2019-07-15 RX ADMIN — ESCITALOPRAM OXALATE 10 MG: 10 TABLET, FILM COATED ORAL at 21:07

## 2019-07-15 RX ADMIN — Medication 10 ML: at 11:12

## 2019-07-15 RX ADMIN — CEFDINIR 300 MG: 300 CAPSULE ORAL at 11:12

## 2019-07-15 RX ADMIN — MIDODRINE HYDROCHLORIDE 2.5 MG: 5 TABLET ORAL at 08:55

## 2019-07-15 RX ADMIN — CARBIDOPA AND LEVODOPA 3 TABLET: 25; 100 TABLET ORAL at 08:43

## 2019-07-15 RX ADMIN — MIDODRINE HYDROCHLORIDE 2.5 MG: 5 TABLET ORAL at 13:06

## 2019-07-15 RX ADMIN — QUETIAPINE FUMARATE 25 MG: 25 TABLET ORAL at 08:43

## 2019-07-15 RX ADMIN — CARBIDOPA AND LEVODOPA 3 TABLET: 25; 100 TABLET ORAL at 13:07

## 2019-07-15 RX ADMIN — CEFTRIAXONE 1 G: 1 INJECTION, POWDER, FOR SOLUTION INTRAMUSCULAR; INTRAVENOUS at 08:42

## 2019-07-15 RX ADMIN — ENOXAPARIN SODIUM 40 MG: 40 INJECTION SUBCUTANEOUS at 08:42

## 2019-07-15 RX ADMIN — INDOMETHACIN 50 MG: 50 CAPSULE ORAL at 11:11

## 2019-07-15 RX ADMIN — CARBIDOPA AND LEVODOPA 3 TABLET: 25; 100 TABLET ORAL at 21:04

## 2019-07-15 RX ADMIN — Medication 1 G: at 13:07

## 2019-07-15 ASSESSMENT — PAIN DESCRIPTION - PROGRESSION
CLINICAL_PROGRESSION: NOT CHANGED

## 2019-07-15 ASSESSMENT — PAIN SCALES - GENERAL
PAINLEVEL_OUTOF10: 0

## 2019-07-15 NOTE — DISCHARGE INSTR - COC
Electronically signed by Vicenta Lopez RN on 7/15/19 at 12:41 PM    PHYSICIAN SECTION    Prognosis: Fair    Condition at Discharge: Stable    Rehab Potential (if transferring to Rehab): Na    Recommended Labs or Other Treatments After Discharge: Follow up with PCP    Physician Certification: I certify the above information and transfer of Zev Dunlap  is necessary for the continuing treatment of the diagnosis listed and that she requires [place] for less 30 days.      Update Admission H&P: No change in H&P    PHYSICIAN SIGNATURE:  Electronically signed by Alejandra Ochoa MD on 7/16/19 at 1:05 PM

## 2019-07-15 NOTE — CARE COORDINATION
CM  Follow ed up on  SNF  Placement:  [address] Calls between  9- 10 am  X's 3 to  [facility]  Requesting update if they can  Accept the pt  ,  medically cleared  Per  Pt's daughter Shantelle , M[place]  Followed up with  Dr Dulce Armijo  And agreeable as well she can  D/c  If  SNF accepts  Her . CM  Received  Call back  From [facility] in admissions and requesed  Updated  Info for  Her  Progress notes  And H & P .  , requested  Info  Faxed  [phone] ,         Yolanda Wolfe RN Case Manager  [facility] [employer]  [address] [facility] [address]  [phone]  Fax [phone]:  UPDATE CM  Received  A call back  From [facility]  And  She stated they can  Accept  Her  And  Meet her  Needs ,  CM  Attempted to call daughter  Maryana Xie   X 's 2  But  Got  VM  And  Mailbox is  Full to  Leave message:  CM  Spoke with  Pt  Who is aware  And  Deferred  CM  Back to  Daughter to make  Another referral.    CM  received  A call back from Shantelle who saw the missed called  And  Discussed the determination ,  Cm  received  2 additional requests  And referals  Were made to  [address] at [facility] [address] CM  Will update  On  Who can  Accept  And go from there . Yolanda Wolfe RN Case Manager  [facility] [employer]  [address] [facility] [address]  [phone]  Fax [phone]    CM  Called  Anjelica Bains  [phone]  To followup on referral at  [address]  ,  She  Is  Reviewing and  DON looking at it , will call back with determination. Electronically signed by Yolanda Wolfe RN on 7/15/2019 at 2:30 PM     MENDEZ  Spoke with Pasha Sebastian  Who declined  Patient at  [facility]  Not able to meet pt needs ,  CM  Spoke with  Anjelica Bains at  [address] can  Accept pt  But will admit to a  [address] a private  Becomes available. Patient  And daughter  Aware  ,  Pt  Daughter  Called and spoke with  [facility] in admissions  And  Asked them to reconsider  : At  [facility] :   They came out and did an on site interview  And agreed to accept  tomorrow  Pending the next 24  Hours  With out  Sitter  Per  The  Daughter  , as  This  CM  Called Melony  2 more  Additional times to  Get  Update  On the interview  and got her  Voice  Mail so  Follow ed up with  Main line  Who stated she was  gone for the day and will follow up tomorrow . CM  Completed  HENS  And  Will anticipate D/C tomorrow  :  Daughter  To transport :  She was informed  [address] transport  ambulette  Cost  $35  Ad  $1.25  Every mile there  After  And she  Stated she would  Transport . RN  Aware  Of  D/C plan , CM  To follow up in AM  .        Keke Duke RN Case Manager  [facility]  [address] [place] [address].   [place] [address]  [phone]  Fax [phone]

## 2019-07-15 NOTE — PROGRESS NOTES
Occupational Therapy   Occupational Therapy Initial Assessment and Treatment  Date: 7/15/2019   Patient Name: nAtony Barakat  MRN: [id number]     : 1940    Date of Service: 7/15/2019    Discharge Recommendations: nAtony Barakat scored a 19/24 on the AM-PAC ADL Inpatient form. Current research shows that an AM-PAC score of 18 or greater is typically associated with a discharge to the patient's home setting. Based on the patients AM-PAC score and their current ADL deficits, it is recommended that the patient have 2-3 sessions per week of Occupational Therapy at d/c to increase the patients independence. *See assessment for further discharge recommendations**    Assessment: Pt's overall functional independence is well below baseline. Pt typically lives and is independent with ADLs, transfers, and mobility. On admit, pt required B wrist and ankle restraints due to severe confusion. Both have since been removed and pt is A&O to person, place, and mostly time. She is currently requiring CGA-min A for most standing ADLs and mobility. Safety concerns are present if pt returns home alone; as she is very unsteady on her feet and appears unaware. Per chart, plan is for SNF at d/c. Feel this is safe plan at this time. Will follow. OT Equipment Recommendations  Equipment Needed: No  Other: defer    Assessment   Performance deficits / Impairments: Decreased functional mobility ; Decreased cognition;Decreased endurance;Decreased ADL status; Decreased balance;Decreased strength    Treatment Diagnosis: Impaired ADLs, activity tolerance, cognition    Patient Education: Role of OT, d/c planning- pt verb understanding    REQUIRES OT FOLLOW UP: Yes    Activity Tolerance: Patient Tolerated treatment well    sSafety Devices in place: Yes  Type of devices: Left in bed;Bed alarm in place;Call light within reach;Nurse notified(sitter outside room)             Treatment Diagnosis: Impaired ADLs, activity tolerance, cognition      Restrictions  Position Activity Restriction  Other position/activity restrictions: up with assist    Subjective   General  Chart Reviewed: Yes    Additional Pertinent Hx: PMH:  Parkinson's, HTN    Family / Caregiver Present: No    Diagnosis: Pt admitted with falls, AMS, dx of UTI-head CT=neg acute process, L foot XR=neg fx/dislocation, OA, CXR=neg    Subjective  Subjective: Pt found supine upon entry; agreeable to OT. \"I think I want to go home. What do you think? \"     Patient Currently in Pain: Denies      Social/Functional History  Social/Functional History  Lives With: Alone  Type of Home: House  Home Layout: Two level, Bed/Bath upstairs(flight of stairs to reach bedroom)  Home Access: Stairs to enter with rails  Entrance Stairs - Number of Steps: pt unable to report how many stairs to enter home or if there are handrails  ADL Assistance: Independent  Homemaking Assistance: Independent  Ambulation Assistance: Independent  Transfer Assistance: Independent  Additional Comments: Limited subjective history obtained due to confusion. Objective    Treatment included functional transfer training, ADLs, and patient education. Vision: Within Functional Limits  Hearing: Within functional limits      Orientation  Overall Orientation Status: (Oriented to place, person, July 2019 but not day)    Balance  Sitting Balance: Supervision    Standing Balance  Activity: functional mobility in room/restroom and hallway, standing for ADLs, toilet and sit-stand transfers     Functional Mobility  Functional - Mobility Device: No device  Activity: To/from bathroom  Assist Level: Minimal assistance(Pt mildly unsteady, reaching out for doorways. One episode of loss of balance in restroom, requiring min assist to correct)  Functional Mobility Comments: Pt ambulated in hallway with RW and CGA.  Improved stability with RW vs no AD; however, pt tends to  walker rather than push, and becomes unsteady when

## 2019-07-15 NOTE — PROGRESS NOTES
CREATININE 0.7 0.6 0.6   GLUCOSE 104* 109* 112*   CALCIUM 9.3 9.7 10.0   MG 1.90 1.90  --    ANIONGAP 12 10 11     Hepatic: No results for input(s): AST, ALT, BILITOT, BILIDIR, PROT, LABALBU, ALKPHOS in the last 72 hours. Troponin: No results for input(s): TROPONINI in the last 72 hours. BNP: No results for input(s): BNP in the last 72 hours. Lipids: No results for input(s): CHOL, HDL in the last 72 hours. Invalid input(s): LDLCALCU, TRIGLYCERIDE  ABGs:  No results for input(s): PHART, HZG3CYQ, PO2ART, RPG4NAN, BEART, THGBART, Q6EMNXVA, NCX5BFT in the last 72 hours. INR: No results for input(s): INR in the last 72 hours. Lactate: No results for input(s): LACTATE in the last 72 hours. Cultures:  -----------------------------------------------------------------  RAD:   XR FOOT LEFT (MIN 3 VIEWS)   Final Result   1. Degenerative midfoot osteoarthritis. CT Head WO Contrast   Final Result      1. No acute intracranial process. 2. Mild cerebral atrophy. 3. Mild chronic small vessel ischemic disease. XR CHEST STANDARD (2 VW)   Final Result      No acute pulmonary disease. Mild cardiomegaly. Assessment/Plan:   76yo F with PMHx of Parkinson's Disease, HTN, Degenerative Disk Disease, Hep B that was admitted for AMS 2/2 UTI and recent fall.     Sepsis - 2/2 UTI   Patient confused, + urgency & frequency, no dysuria. Not febrile. Leukocytosis improved from 13.2 on admission. UA showed 6-10 wbc/hpf, 4+bacteira, small leukocyte esterase. Pro-calcitonin 0.08. Blood culture NGTD. Urine culture shows proteus mirabilis with >100,000 CFU/mL. -Discontinued Rocephin  -Started Cefdinir 300mg BID    Gout  Acute flare in left foot, first digit. Tenderness on palpation.  - Indomethacin     Orthostatic Hypotension 2/2 autonomic dysfunction associated with Parkinson's Disease. Pt has dizziness when getting up, multiple falls, +ortostatic vitals. TSH WNL. ECG showed prolonged QTc.  Resolved to QTc 469.  -ECHO pending  -Continue midodrine 2.5mg BID  -Drink 8oz cup of water before getting up slowly  -Sodium chloride tablet  -Nephrology consulted - appreciate recs     Supine Hypertension  24-hr urine metanephrines, aldosterone and renin wnl.  - Elevate HOB  - Aldactone nightly     Delirium - likely 2/2 UTI  Requiring restraints and antipsychotics and benzos. - Psychiatry consulted - appreciate recs     Parkinson's Disease  Neurologist Dr. PRUITT. Avoid neuroleptics and prefer Ativan PRN if necessary.  -Sinemet 100mg tablets - 3 tablets 4 times a day (8am, 1pm, 5pm, 8pm)  -Folic acid 1mg daily  -Nortripryline 75mg nightly  -Seroquel 300mg nightly and 25mg morning  -Continue Telemetry  - Neurology consulted - appreciate recs  - PT/OT - SNF after discharge     Suspected foot fracture secondary to recent fall  Recent fall. Left foot pain and redness on dorsum of foot. No pain with passive movement. Unable to assess weight bearing. No edema. Xray negative for fractures or dislocations.   - Resolved      Code Status: Full Code   FEN: General diet  PPX: Lovenox  DISPO: SNF    Natalia Santos MD, PGY-1  07/15/19  5:38 AM    This patient has been staffed and discussed with Babara Lombard, MD.

## 2019-07-16 VITALS
OXYGEN SATURATION: 97 % | SYSTOLIC BLOOD PRESSURE: 188 MMHG | HEART RATE: 65 BPM | WEIGHT: 154.32 LBS | DIASTOLIC BLOOD PRESSURE: 90 MMHG | RESPIRATION RATE: 14 BRPM | HEIGHT: 66 IN | TEMPERATURE: 97.6 F | BODY MASS INDEX: 24.8 KG/M2

## 2019-07-16 LAB
ANION GAP SERPL CALCULATED.3IONS-SCNC: 11 MMOL/L (ref 3–16)
BASOPHILS ABSOLUTE: 0 K/UL (ref 0–0.2)
BASOPHILS RELATIVE PERCENT: 0.4 %
BLOOD CULTURE, ROUTINE: NORMAL
BUN BLDV-MCNC: 17 MG/DL (ref 7–20)
CALCIUM SERPL-MCNC: 10.4 MG/DL (ref 8.3–10.6)
CHLORIDE BLD-SCNC: 101 MMOL/L (ref 99–110)
CO2: 29 MMOL/L (ref 21–32)
CREAT SERPL-MCNC: 0.8 MG/DL (ref 0.6–1.2)
CULTURE, BLOOD 2: NORMAL
EKG ATRIAL RATE: 61 BPM
EKG DIAGNOSIS: NORMAL
EKG P AXIS: 52 DEGREES
EKG P-R INTERVAL: 206 MS
EKG Q-T INTERVAL: 450 MS
EKG QRS DURATION: 98 MS
EKG QTC CALCULATION (BAZETT): 453 MS
EKG R AXIS: -33 DEGREES
EKG T AXIS: 35 DEGREES
EKG VENTRICULAR RATE: 61 BPM
EOSINOPHILS ABSOLUTE: 0.2 K/UL (ref 0–0.6)
EOSINOPHILS RELATIVE PERCENT: 3.3 %
GFR AFRICAN AMERICAN: >60
GFR NON-AFRICAN AMERICAN: >60
GLUCOSE BLD-MCNC: 115 MG/DL (ref 70–99)
HCT VFR BLD CALC: 37.9 % (ref 36–48)
HEMOGLOBIN: 12.6 G/DL (ref 12–16)
LYMPHOCYTES ABSOLUTE: 1.4 K/UL (ref 1–5.1)
LYMPHOCYTES RELATIVE PERCENT: 25.3 %
MCH RBC QN AUTO: 29.2 PG (ref 26–34)
MCHC RBC AUTO-ENTMCNC: 33.3 G/DL (ref 31–36)
MCV RBC AUTO: 87.7 FL (ref 80–100)
MONOCYTES ABSOLUTE: 0.6 K/UL (ref 0–1.3)
MONOCYTES RELATIVE PERCENT: 9.9 %
NEUTROPHILS ABSOLUTE: 3.5 K/UL (ref 1.7–7.7)
NEUTROPHILS RELATIVE PERCENT: 61.1 %
PDW BLD-RTO: 14.4 % (ref 12.4–15.4)
PLATELET # BLD: 251 K/UL (ref 135–450)
PMV BLD AUTO: 9 FL (ref 5–10.5)
POTASSIUM REFLEX MAGNESIUM: 3.8 MMOL/L (ref 3.5–5.1)
RBC # BLD: 4.32 M/UL (ref 4–5.2)
SODIUM BLD-SCNC: 141 MMOL/L (ref 136–145)
WBC # BLD: 5.7 K/UL (ref 4–11)

## 2019-07-16 PROCEDURE — 6370000000 HC RX 637 (ALT 250 FOR IP): Performed by: INTERNAL MEDICINE

## 2019-07-16 PROCEDURE — 93005 ELECTROCARDIOGRAM TRACING: CPT | Performed by: INTERNAL MEDICINE

## 2019-07-16 PROCEDURE — 36415 COLL VENOUS BLD VENIPUNCTURE: CPT

## 2019-07-16 PROCEDURE — 85025 COMPLETE CBC W/AUTO DIFF WBC: CPT

## 2019-07-16 PROCEDURE — 93010 ELECTROCARDIOGRAM REPORT: CPT | Performed by: INTERNAL MEDICINE

## 2019-07-16 PROCEDURE — 80048 BASIC METABOLIC PNL TOTAL CA: CPT

## 2019-07-16 PROCEDURE — 6360000002 HC RX W HCPCS: Performed by: STUDENT IN AN ORGANIZED HEALTH CARE EDUCATION/TRAINING PROGRAM

## 2019-07-16 PROCEDURE — 6370000000 HC RX 637 (ALT 250 FOR IP): Performed by: STUDENT IN AN ORGANIZED HEALTH CARE EDUCATION/TRAINING PROGRAM

## 2019-07-16 RX ORDER — SPIRONOLACTONE 50 MG/1
50 TABLET, FILM COATED ORAL NIGHTLY
Qty: 30 TABLET | Refills: 3 | Status: SHIPPED | OUTPATIENT
Start: 2019-07-16

## 2019-07-16 RX ORDER — CEFDINIR 300 MG/1
300 CAPSULE ORAL EVERY 12 HOURS SCHEDULED
Qty: 6 CAPSULE | Refills: 0 | Status: SHIPPED | OUTPATIENT
Start: 2019-07-16 | End: 2019-07-19

## 2019-07-16 RX ORDER — INDOMETHACIN 50 MG/1
50 CAPSULE ORAL
Qty: 60 CAPSULE | Refills: 0 | Status: SHIPPED | OUTPATIENT
Start: 2019-07-16 | End: 2019-07-20

## 2019-07-16 RX ORDER — PANTOPRAZOLE SODIUM 40 MG/1
40 TABLET, DELAYED RELEASE ORAL
Qty: 30 TABLET | Refills: 3 | Status: SHIPPED | OUTPATIENT
Start: 2019-07-17

## 2019-07-16 RX ADMIN — ENOXAPARIN SODIUM 40 MG: 40 INJECTION SUBCUTANEOUS at 10:24

## 2019-07-16 RX ADMIN — INDOMETHACIN 50 MG: 50 CAPSULE ORAL at 10:21

## 2019-07-16 RX ADMIN — CARBIDOPA AND LEVODOPA 3 TABLET: 25; 100 TABLET ORAL at 13:52

## 2019-07-16 RX ADMIN — PANTOPRAZOLE SODIUM 40 MG: 40 TABLET, DELAYED RELEASE ORAL at 05:47

## 2019-07-16 RX ADMIN — Medication 1 G: at 13:52

## 2019-07-16 RX ADMIN — CEFDINIR 300 MG: 300 CAPSULE ORAL at 10:24

## 2019-07-16 RX ADMIN — QUETIAPINE FUMARATE 25 MG: 25 TABLET ORAL at 10:15

## 2019-07-16 RX ADMIN — CARBIDOPA AND LEVODOPA 3 TABLET: 25; 100 TABLET ORAL at 10:15

## 2019-07-16 RX ADMIN — INDOMETHACIN 50 MG: 50 CAPSULE ORAL at 13:52

## 2019-07-16 RX ADMIN — FOLIC ACID 1 MG: 1 TABLET ORAL at 10:15

## 2019-07-16 RX ADMIN — Medication 1 G: at 10:24

## 2019-07-16 RX ADMIN — MIDODRINE HYDROCHLORIDE 2.5 MG: 5 TABLET ORAL at 10:15

## 2019-07-16 RX ADMIN — FAMOTIDINE 20 MG: 20 TABLET, FILM COATED ORAL at 10:15

## 2019-07-16 ASSESSMENT — PAIN SCALES - GENERAL
PAINLEVEL_OUTOF10: 2
PAINLEVEL_OUTOF10: 2

## 2019-07-16 ASSESSMENT — PAIN DESCRIPTION - PROGRESSION
CLINICAL_PROGRESSION: NOT CHANGED

## 2019-07-16 NOTE — PROGRESS NOTES
D/C order noted. Report called to [place] spoke with Cy Ashley. Pt's daughter given packet for transport. Pt transported off unit per wheelchair for family transport to [place].

## 2019-07-16 NOTE — DISCHARGE SUMMARY
Head: Normocephalic. Nose: Nose normal.    Eyes: Pupils are equal, round, and reactive to light. EOM are normal. Right eye exhibits no discharge. Left eye exhibits no discharge. No scleral icterus. Neck: Normal range of motion. No tracheal deviation present. Cardiovascular: Normal rate and regular rhythm. Exam reveals no gallop and no friction rub. Pulmonary/Chest: Effort normal and breath sounds normal. No respiratory distress. She has no wheezes. She has no rales. She exhibits no tenderness. Abdominal: Soft. She exhibits no distension. There is no tenderness. There is no rebound and no guarding.   Musculoskeletal: Normal range of motion. She exhibits no edema. No tenderness, erythema or swelling of feet bilaterally. Neurological: She is alert and oriented to person, place, and time. No sensory deficit. Skin: Skin is warm and dry. She is not diaphoretic. No pallor. Psychiatric: She has a normal mood and affect. Her behavior is normal.   Nursing note and vitals reviewed.     Consults: nephrology, neurology and psychiatry  Treatments: antibiotics: ceftriaxone  Disposition: SNF  Discharged Condition: Stable  Follow Up: Primary Care Physician in one week    DISCHARGE MEDICATION:     Medication List      START taking these medications    cefdinir 300 MG capsule  Commonly known as:  OMNICEF  Take 1 capsule by mouth every 12 hours for 3 days     indomethacin 50 MG capsule  Commonly known as:  INDOCIN  Take 1 capsule by mouth 3 times daily (with meals) for 4 days     pantoprazole 40 MG tablet  Commonly known as:  PROTONIX  Take 1 tablet by mouth every morning (before breakfast)  Start taking on:  7/17/2019  Replaces:  omeprazole 20 MG delayed release capsule     spironolactone 50 MG tablet  Commonly known as:  ALDACTONE  Take 1 tablet by mouth nightly        CONTINUE taking these medications    carbidopa-levodopa  MG per tablet  Commonly known as:  SINEMET     escitalopram 10 MG tablet  Commonly known

## 2019-07-16 NOTE — PROGRESS NOTES
[facility]    [facility]. [facility]              [phone]                       Plan :       Urine electrolytes are reviewed  Continue ProAmatine to 2.5 mg twice a day  Stop IV fluid  Compression stockings up to the thigh  Continue spironolactone 50 mg  q. Nightly    Okay to discharge and follow-up in office in 3 to 4 weeks. Assessment :       Orthostatic hypotension:     I will continue ProAmatine 2.5 mg in the morning and another dose in the afternoon but ovoid before she goes to bed because she normally has supine hypertension. She also saw Neurology Dr. Eliza Anderson today who is gradually reducing her Sinemet which might help with orthostatic hypotension. 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 Hypertension workup included normal 24-hour urine metanephrines, aldosterone less than 2, renin 0.2 this was done while she was taking Florinef. Cosyntropin test was normal with cortisol level 29       Hypokalemia:  going on since January 2019  does coincide with the start of Florinef, which was started for orthostatic hypotension. Florinef is stopped. Consider stopping Protonix which can cause hypokalemia and hypomagnesemia     Hypertension. Laying Blood pressure is high  Off Procardia-XL and losartan ,      Possible aortic stenosis. She does have an ejection systolic murmur at the base with relatively soft second sound, will need an echocardiogram.     Parkisonism      Hyperparathyroidism. She has been seen by Dr. Oli Rico, workup has been done. Most likely she does have primary hyperparathyroidism. Her calcium was 9.3 with , which is highly suggestive of primary hyperparathyroidism    ultrasound in the past of the neck did not reveal any parathyroid adenoma. She was supposed to get nuclear scan.   Her DEXA scan induration- no, tightening - no  Psychiatric: Confused     LABS:     Recent Labs     07/14/19  0610 07/15/19  0720 07/16/19  0837   WBC 6.8 6.5 5.7   HGB 11.8* 12.5 12.6   HCT 34.9* 37.4 37.9    213 251     Recent Labs     07/14/19  0610 07/15/19  0720 07/16/19  0837    139 141   K 4.1 3.9 3.8    102 101   CO2 25 25 29   BUN 16 13 17   CREATININE 0.6 0.7 0.8   GLUCOSE 112* 96 115*      Nephrology  [address], [address]  Office: [phone]  Cell: [phone]  Fax: [phone]

## 2019-07-16 NOTE — PLAN OF CARE
Problem: Falls - Risk of:  Goal: Will remain free from falls  Description  Will remain free from falls  Outcome: Ongoing  Note:   Patient is a fall risk. Patient is stand by assist. See Fall Risk assessment for details. Bed is in low, lock position; call light/belongings within reach. No attempts to get out of bed have been made, calls appropriately when assistance is needed. Bed alarm and hourly rounds in place for safety. Will continue to monitor and reassess throughout shift. Problem: Falls - Risk of:  Goal: Absence of physical injury  Description  Absence of physical injury  Outcome: Ongoing     Problem: Risk for Impaired Skin Integrity  Goal: Tissue integrity - skin and mucous membranes  Description  Structural intactness and normal physiological function of skin and  mucous membranes. Outcome: Ongoing  Note:   Pt at risk for skin breakdown. Skin assessment as documented. Pts skin cleansed with inc cleanser as needed. Pt repositioned in bed with pillow support as needed. Call light within reach. Will continue to monitor. Problem: Pain:  Goal: Pain level will decrease  Description  Pain level will decrease  Outcome: Ongoing  Note:   Pt resting at this time. No complaints of pain. Encouraged patient to call out with any needs. Will continue to monitor.        Problem: Pain:  Goal: Control of acute pain  Description  Control of acute pain  Outcome: Ongoing     Problem: Pain:  Goal: Control of chronic pain  Description  Control of chronic pain  Outcome: Ongoing

## 2019-08-10 PROBLEM — N39.0 URINARY TRACT INFECTION: Status: RESOLVED | Noted: 2019-07-11 | Resolved: 2019-08-10

## 2022-01-26 DIAGNOSIS — D50.9 IRON DEFICIENCY ANEMIA, UNSPECIFIED IRON DEFICIENCY ANEMIA TYPE: ICD-10-CM

## 2022-01-26 RX ORDER — SODIUM CHLORIDE 9 MG/ML
INJECTION, SOLUTION INTRAVENOUS CONTINUOUS
OUTPATIENT
Start: 2022-01-26

## 2022-01-26 RX ORDER — DIPHENHYDRAMINE HYDROCHLORIDE 50 MG/ML
50 INJECTION INTRAMUSCULAR; INTRAVENOUS
OUTPATIENT
Start: 2022-01-26

## 2022-01-26 RX ORDER — ACETAMINOPHEN 325 MG/1
650 TABLET ORAL
OUTPATIENT
Start: 2022-01-26

## 2022-01-26 RX ORDER — HEPARIN SODIUM (PORCINE) LOCK FLUSH IV SOLN 100 UNIT/ML 100 UNIT/ML
500 SOLUTION INTRAVENOUS PRN
OUTPATIENT
Start: 2022-01-26

## 2022-01-26 RX ORDER — ONDANSETRON 2 MG/ML
8 INJECTION INTRAMUSCULAR; INTRAVENOUS
OUTPATIENT
Start: 2022-01-26

## 2022-01-26 RX ORDER — SODIUM CHLORIDE 0.9 % (FLUSH) 0.9 %
5-40 SYRINGE (ML) INJECTION PRN
OUTPATIENT
Start: 2022-01-26

## 2022-01-26 RX ORDER — SODIUM CHLORIDE 9 MG/ML
25 INJECTION, SOLUTION INTRAVENOUS PRN
OUTPATIENT
Start: 2022-01-26

## 2023-08-19 ENCOUNTER — APPOINTMENT (OUTPATIENT)
Dept: CT IMAGING | Age: 83
End: 2023-08-19
Payer: MEDICARE

## 2023-08-19 ENCOUNTER — APPOINTMENT (OUTPATIENT)
Dept: GENERAL RADIOLOGY | Age: 83
End: 2023-08-19
Payer: MEDICARE

## 2023-08-19 ENCOUNTER — HOSPITAL ENCOUNTER (EMERGENCY)
Age: 83
Discharge: HOME | End: 2023-08-19
Attending: STUDENT IN AN ORGANIZED HEALTH CARE EDUCATION/TRAINING PROGRAM
Payer: MEDICARE

## 2023-08-19 VITALS
DIASTOLIC BLOOD PRESSURE: 92 MMHG | SYSTOLIC BLOOD PRESSURE: 157 MMHG | TEMPERATURE: 98.3 F | OXYGEN SATURATION: 100 % | WEIGHT: 122.2 LBS | BODY MASS INDEX: 19.64 KG/M2 | HEART RATE: 79 BPM | RESPIRATION RATE: 17 BRPM
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
## 2023-08-19 PROCEDURE — 73030 X-RAY EXAM OF SHOULDER: CPT

## 2023-08-19 PROCEDURE — 71045 X-RAY EXAM CHEST 1 VIEW: CPT

## 2023-08-19 PROCEDURE — 73070 X-RAY EXAM OF ELBOW: CPT

## 2023-08-19 PROCEDURE — 6370000000 HC RX 637 (ALT 250 FOR IP): Performed by: PHYSICIAN ASSISTANT

## 2023-08-19 PROCEDURE — 6370000000 HC RX 637 (ALT 250 FOR IP): Performed by: STUDENT IN AN ORGANIZED HEALTH CARE EDUCATION/TRAINING PROGRAM

## 2023-08-19 PROCEDURE — 99284 EMERGENCY DEPT VISIT MOD MDM: CPT

## 2023-08-19 RX ORDER — QUETIAPINE FUMARATE 25 MG/1
25 TABLET, FILM COATED ORAL ONCE
Status: DISCONTINUED | OUTPATIENT
Start: 2023-08-19 | End: 2023-08-19

## 2023-08-19 RX ORDER — QUETIAPINE FUMARATE 100 MG/1
100 TABLET, FILM COATED ORAL ONCE
Status: COMPLETED | OUTPATIENT
Start: 2023-08-19 | End: 2023-08-19

## 2023-08-19 RX ADMIN — QUETIAPINE FUMARATE 100 MG: 100 TABLET ORAL at 10:35

## 2023-08-19 RX ADMIN — CARBIDOPA AND LEVODOPA 1 TABLET: 10; 100 TABLET ORAL at 10:35

## 2023-08-19 ASSESSMENT — PAIN - FUNCTIONAL ASSESSMENT: PAIN_FUNCTIONAL_ASSESSMENT: NONE - DENIES PAIN

## 2023-08-19 NOTE — DISCHARGE INSTRUCTIONS
Jeremiah Trent was evaluated today after a fall. Based on our conversations with her family members, we elected to perform minimal interventions and imaging aligning with their wishes. She had x-rays of her chest, shoulder and elbow, which did not show any fractures. She can continue to work with hospice to focus on comfort measures.